# Patient Record
Sex: FEMALE | Race: WHITE | NOT HISPANIC OR LATINO
[De-identification: names, ages, dates, MRNs, and addresses within clinical notes are randomized per-mention and may not be internally consistent; named-entity substitution may affect disease eponyms.]

---

## 2018-02-26 ENCOUNTER — APPOINTMENT (OUTPATIENT)
Dept: ORTHOPEDIC SURGERY | Facility: CLINIC | Age: 28
End: 2018-02-26
Payer: COMMERCIAL

## 2018-02-26 VITALS
HEIGHT: 61 IN | BODY MASS INDEX: 21.14 KG/M2 | SYSTOLIC BLOOD PRESSURE: 99 MMHG | WEIGHT: 112 LBS | DIASTOLIC BLOOD PRESSURE: 59 MMHG | HEART RATE: 63 BPM

## 2018-02-26 DIAGNOSIS — S52.571A OTHER INTRAARTICULAR FRACTURE OF LOWER END OF RIGHT RADIUS, INITIAL ENCOUNTER FOR CLOSED FRACTURE: ICD-10-CM

## 2018-02-26 DIAGNOSIS — Z87.09 PERSONAL HISTORY OF OTHER DISEASES OF THE RESPIRATORY SYSTEM: ICD-10-CM

## 2018-02-26 PROBLEM — Z00.00 ENCOUNTER FOR PREVENTIVE HEALTH EXAMINATION: Status: ACTIVE | Noted: 2018-02-26

## 2018-02-26 PROCEDURE — 99204 OFFICE O/P NEW MOD 45 MIN: CPT

## 2018-02-26 PROCEDURE — 73110 X-RAY EXAM OF WRIST: CPT | Mod: 26,RT

## 2018-02-27 ENCOUNTER — APPOINTMENT (OUTPATIENT)
Dept: ORTHOPEDIC SURGERY | Facility: HOSPITAL | Age: 28
End: 2018-02-27

## 2018-02-27 ENCOUNTER — TRANSCRIPTION ENCOUNTER (OUTPATIENT)
Age: 28
End: 2018-02-27

## 2018-02-27 ENCOUNTER — OUTPATIENT (OUTPATIENT)
Dept: OUTPATIENT SERVICES | Facility: HOSPITAL | Age: 28
LOS: 1 days | End: 2018-02-27
Payer: COMMERCIAL

## 2018-02-27 VITALS
WEIGHT: 114.64 LBS | TEMPERATURE: 98 F | OXYGEN SATURATION: 97 % | DIASTOLIC BLOOD PRESSURE: 72 MMHG | SYSTOLIC BLOOD PRESSURE: 106 MMHG | RESPIRATION RATE: 14 BRPM | HEART RATE: 98 BPM | HEIGHT: 62 IN

## 2018-02-27 VITALS
SYSTOLIC BLOOD PRESSURE: 112 MMHG | OXYGEN SATURATION: 98 % | HEART RATE: 78 BPM | DIASTOLIC BLOOD PRESSURE: 74 MMHG | RESPIRATION RATE: 14 BRPM

## 2018-02-27 DIAGNOSIS — S52.90XA UNSPECIFIED FRACTURE OF UNSPECIFIED FOREARM, INITIAL ENCOUNTER FOR CLOSED FRACTURE: ICD-10-CM

## 2018-02-27 DIAGNOSIS — Z90.89 ACQUIRED ABSENCE OF OTHER ORGANS: Chronic | ICD-10-CM

## 2018-02-27 DIAGNOSIS — S52.571A OTHER INTRAARTICULAR FRACTURE OF LOWER END OF RIGHT RADIUS, INITIAL ENCOUNTER FOR CLOSED FRACTURE: ICD-10-CM

## 2018-02-27 DIAGNOSIS — J32.9 CHRONIC SINUSITIS, UNSPECIFIED: Chronic | ICD-10-CM

## 2018-02-27 LAB
HCG UR QL: NEGATIVE — SIGNIFICANT CHANGE UP
HCT VFR BLD CALC: 40.8 % — SIGNIFICANT CHANGE UP (ref 34.5–45)
HGB BLD-MCNC: 13.6 G/DL — SIGNIFICANT CHANGE UP (ref 11.5–15.5)
MCHC RBC-ENTMCNC: 27.2 PG — SIGNIFICANT CHANGE UP (ref 27–34)
MCHC RBC-ENTMCNC: 33.3 GM/DL — SIGNIFICANT CHANGE UP (ref 32–36)
MCV RBC AUTO: 81.9 FL — SIGNIFICANT CHANGE UP (ref 80–100)
PLATELET # BLD AUTO: 174 K/UL — SIGNIFICANT CHANGE UP (ref 150–400)
RBC # BLD: 4.98 M/UL — SIGNIFICANT CHANGE UP (ref 3.8–5.2)
RBC # FLD: 11.8 % — SIGNIFICANT CHANGE UP (ref 10.3–14.5)
WBC # BLD: 6.5 K/UL — SIGNIFICANT CHANGE UP (ref 3.8–10.5)
WBC # FLD AUTO: 6.5 K/UL — SIGNIFICANT CHANGE UP (ref 3.8–10.5)

## 2018-02-27 PROCEDURE — 29125 APPL SHORT ARM SPLINT STATIC: CPT | Mod: 59,RT

## 2018-02-27 PROCEDURE — C1713: CPT

## 2018-02-27 PROCEDURE — 76000 FLUOROSCOPY <1 HR PHYS/QHP: CPT

## 2018-02-27 PROCEDURE — 81025 URINE PREGNANCY TEST: CPT

## 2018-02-27 PROCEDURE — 25609 OPTX DST RD XART FX/EP SEP3+: CPT | Mod: RT

## 2018-02-27 PROCEDURE — 76000 FLUOROSCOPY <1 HR PHYS/QHP: CPT | Mod: 26,59,RT

## 2018-02-27 PROCEDURE — C1889: CPT

## 2018-02-27 PROCEDURE — 85027 COMPLETE CBC AUTOMATED: CPT

## 2018-02-27 RX ORDER — ONDANSETRON 8 MG/1
4 TABLET, FILM COATED ORAL ONCE
Qty: 0 | Refills: 0 | Status: DISCONTINUED | OUTPATIENT
Start: 2018-02-27 | End: 2018-02-28

## 2018-02-27 RX ORDER — SODIUM CHLORIDE 9 MG/ML
1000 INJECTION, SOLUTION INTRAVENOUS
Qty: 0 | Refills: 0 | Status: DISCONTINUED | OUTPATIENT
Start: 2018-02-27 | End: 2018-02-28

## 2018-02-27 RX ORDER — OXYCODONE HYDROCHLORIDE 5 MG/1
1 TABLET ORAL
Qty: 30 | Refills: 0
Start: 2018-02-27

## 2018-02-27 RX ORDER — OXYCODONE AND ACETAMINOPHEN 5; 325 MG/1; MG/1
1 TABLET ORAL EVERY 4 HOURS
Qty: 0 | Refills: 0 | Status: DISCONTINUED | OUTPATIENT
Start: 2018-02-27 | End: 2018-02-28

## 2018-02-27 RX ORDER — HYDROMORPHONE HYDROCHLORIDE 2 MG/ML
0.5 INJECTION INTRAMUSCULAR; INTRAVENOUS; SUBCUTANEOUS
Qty: 0 | Refills: 0 | Status: DISCONTINUED | OUTPATIENT
Start: 2018-02-27 | End: 2018-02-28

## 2018-02-27 RX ORDER — OXYCODONE AND ACETAMINOPHEN 5; 325 MG/1; MG/1
2 TABLET ORAL EVERY 6 HOURS
Qty: 0 | Refills: 0 | Status: DISCONTINUED | OUTPATIENT
Start: 2018-02-27 | End: 2018-02-28

## 2018-02-27 RX ORDER — CEFAZOLIN SODIUM 1 G
2000 VIAL (EA) INJECTION ONCE
Qty: 0 | Refills: 0 | Status: DISCONTINUED | OUTPATIENT
Start: 2018-02-27 | End: 2018-02-27

## 2018-02-27 NOTE — ASU DISCHARGE PLAN (ADULT/PEDIATRIC). - MEDICATION SUMMARY - MEDICATIONS TO TAKE
I will START or STAY ON the medications listed below when I get home from the hospital:    acetaminophen-oxyCODONE 325 mg-5 mg oral tablet  -- 1 tab(s) by mouth every 6 hours, As Needed - for -for severe pain MDD:6 tabs  -- Caution federal law prohibits the transfer of this drug to any person other  than the person for whom it was prescribed.  May cause drowsiness.  Alcohol may intensify this effect.  Use care when operating dangerous machinery.  This prescription cannot be refilled.  This product contains acetaminophen.  Do not use  with any other product containing acetaminophen to prevent possible liver damage.  Using more of this medication than prescribed may cause serious breathing problems.    -- Indication: For Severe pain as needed    albuterol with CFC 90 mcg/inh inhalation aerosol (obsolete)  -- Indication: For Asthma

## 2018-02-27 NOTE — BRIEF OPERATIVE NOTE - PROCEDURE
<<-----Click on this checkbox to enter Procedure ORIF fracture of right radius  02/27/2018  distal radius  Active  [email or web address]

## 2018-02-27 NOTE — ASU DISCHARGE PLAN (ADULT/PEDIATRIC). - FOLLOWUP APPOINTMENT CLINIC/PHYSICIAN
Follow up Dr. Jarrett as directed  Follow up primary care MD following discharge from facility Follow up Dr. Jarrett on 3/7/2018  Follow up primary care MD following discharge from facility

## 2018-02-27 NOTE — ASU DISCHARGE PLAN (ADULT/PEDIATRIC). - NOTIFY
Swelling that continues/Numbness, tingling/Numbness, color, or temperature change to extremity/Fever greater than 101/GYN Fever>100.4/Bleeding that does not stop Bleeding that does not stop/Swelling that continues/Numbness, color, or temperature change to extremity/Numbness, tingling/Fever greater than 101/Pain not relieved by Medications

## 2018-02-27 NOTE — ANESTHESIA FOLLOW-UP NOTE - NSRECOMMENDFT_GEN_ALL_CORE
Extensive discussion about sedation vs general anesthesia.  Patient prefers sedation.  Regional nerve block working properly, patient informed they may be aware of parts of the procedure and that IV sedation may not be enough to prevent awareness.  Patient understands and prefers to proceed with IV sedation.

## 2018-02-27 NOTE — H&P PST ADULT - MUSCULOSKELETAL COMMENTS
Right radius fracture right arm on splint wrapped in ace bandage < +echuymosis on left elbow right arm on splint wrapped in ace bandage , fingers mobile, cap refill >3 seconds ,+ brachial  pulses +ecchymosis on left elbow right arm on splint wrapped in ace bandage , fingers mobile, cap refill <3 seconds ,+ brachial  pulses, ecchymosis noted on left fore arm anterior and posterior aspect of elbow s/p fall

## 2018-02-27 NOTE — H&P PST ADULT - HISTORY OF PRESENT ILLNESS
This is a 28 y/o female who sustained right radius fracture presents with complaint of right arm pain and swelling . patient states she fell while snow boarding at [place] on 2/27/18 and fractured her radius  Went to Er at [place] Xray revealed right distal radius fracture . scheduled for ORIF right distal radius fracture on 2/27/18 This is a 26 y/o female who sustained right radius fracture presents with complaint of right arm pain and swelling . patient states she fell while snow boarding at [place] on 2/27/18 and fractured her radius  Went to ER at [place] Xray revealed right distal radius fracture . scheduled for ORIF right distal radius fracture on 2/27/18

## 2018-02-27 NOTE — ASU DISCHARGE PLAN (ADULT/PEDIATRIC). - SPECIAL INSTRUCTIONS
Rest   ice  elevate  wiggle finger wrist hand   Pain medications as needed  non- weight bearing right upper extremity Rest   ice  elevate  wiggle finger wrist hand   Pain medications as needed  non- weight bearing right upper extremity  sling

## 2018-02-27 NOTE — BRIEF OPERATIVE NOTE - PRE-OP DX
Fracture  02/27/2018  right distal radius  Active  Tio Waterman  Fracture  02/27/2018  right distal radius  Active  Tio Waterman Fracture  02/27/2018  right distal radius  Active  Tio Waterman  Fracture  02/27/2018  right distal radius  Active  Tio Waterman  Fracture of wrist  02/27/2018    Active  Christian Mejia

## 2018-02-27 NOTE — H&P PST ADULT - PSH
S/P tonsillectomy and adenoidectomy  2002  Sinusitis with nasal polyps  s/p removal of nasal polyps 2003

## 2018-02-28 ENCOUNTER — CHART COPY (OUTPATIENT)
Age: 28
End: 2018-02-28

## 2018-02-28 DIAGNOSIS — Z96.7 PRESENCE OF OTHER BONE AND TENDON IMPLANTS: ICD-10-CM

## 2018-02-28 DIAGNOSIS — Z87.81 PRESENCE OF OTHER BONE AND TENDON IMPLANTS: ICD-10-CM

## 2018-03-02 RX ORDER — OXYCODONE 5 MG/1
5 TABLET ORAL
Qty: 30 | Refills: 0 | Status: ACTIVE | COMMUNITY
Start: 2018-03-02 | End: 1900-01-01

## 2018-03-07 ENCOUNTER — APPOINTMENT (OUTPATIENT)
Dept: ORTHOPEDIC SURGERY | Facility: CLINIC | Age: 28
End: 2018-03-07
Payer: COMMERCIAL

## 2018-03-07 PROCEDURE — A4590: CPT | Mod: 58,RT

## 2018-03-07 PROCEDURE — 73110 X-RAY EXAM OF WRIST: CPT | Mod: RT

## 2018-03-07 PROCEDURE — 99024 POSTOP FOLLOW-UP VISIT: CPT

## 2018-03-07 PROCEDURE — 29075 APPL CST ELBW FNGR SHORT ARM: CPT | Mod: 58,RT

## 2018-03-28 ENCOUNTER — APPOINTMENT (OUTPATIENT)
Dept: ORTHOPEDIC SURGERY | Facility: CLINIC | Age: 28
End: 2018-03-28
Payer: COMMERCIAL

## 2018-03-28 VITALS — HEIGHT: 61 IN | WEIGHT: 112 LBS | BODY MASS INDEX: 21.14 KG/M2

## 2018-03-28 PROCEDURE — 73110 X-RAY EXAM OF WRIST: CPT | Mod: RT

## 2018-03-28 PROCEDURE — 99024 POSTOP FOLLOW-UP VISIT: CPT

## 2018-04-18 ENCOUNTER — APPOINTMENT (OUTPATIENT)
Dept: ORTHOPEDIC SURGERY | Facility: CLINIC | Age: 28
End: 2018-04-18

## 2018-04-23 ENCOUNTER — TRANSCRIPTION ENCOUNTER (OUTPATIENT)
Age: 28
End: 2018-04-23

## 2018-04-26 PROBLEM — S52.571D OTHER CLOSED INTRA-ARTICULAR FRACTURE OF DISTAL END OF RIGHT RADIUS WITH ROUTINE HEALING, SUBSEQUENT ENCOUNTER: Status: ACTIVE | Noted: 2018-03-05

## 2018-04-27 ENCOUNTER — APPOINTMENT (OUTPATIENT)
Dept: ORTHOPEDIC SURGERY | Facility: CLINIC | Age: 28
End: 2018-04-27

## 2018-04-27 DIAGNOSIS — S52.571D OTHER INTRAARTICULAR FRACTURE OF LOWER END OF RIGHT RADIUS, SUBSEQUENT ENCOUNTER FOR CLOSED FRACTURE WITH ROUTINE HEALING: ICD-10-CM

## 2018-07-17 PROBLEM — J45.909 UNSPECIFIED ASTHMA, UNCOMPLICATED: Chronic | Status: ACTIVE | Noted: 2018-02-27

## 2020-05-07 PROBLEM — S52.90XA UNSPECIFIED FRACTURE OF UNSPECIFIED FOREARM, INITIAL ENCOUNTER FOR CLOSED FRACTURE: Chronic | Status: ACTIVE | Noted: 2018-02-27

## 2020-06-03 ENCOUNTER — INPATIENT (INPATIENT)
Facility: HOSPITAL | Age: 30
LOS: 0 days | Discharge: HOME | End: 2020-06-04
Attending: OBSTETRICS & GYNECOLOGY | Admitting: OBSTETRICS & GYNECOLOGY
Payer: COMMERCIAL

## 2020-06-03 VITALS — WEIGHT: 138.89 LBS | HEIGHT: 61 IN

## 2020-06-03 DIAGNOSIS — O26.899 OTHER SPECIFIED PREGNANCY RELATED CONDITIONS, UNSPECIFIED TRIMESTER: ICD-10-CM

## 2020-06-03 DIAGNOSIS — Z90.89 ACQUIRED ABSENCE OF OTHER ORGANS: Chronic | ICD-10-CM

## 2020-06-03 DIAGNOSIS — J32.9 CHRONIC SINUSITIS, UNSPECIFIED: Chronic | ICD-10-CM

## 2020-06-03 LAB
BASOPHILS # BLD AUTO: 0.03 K/UL — SIGNIFICANT CHANGE UP (ref 0–0.2)
BASOPHILS NFR BLD AUTO: 0.2 % — SIGNIFICANT CHANGE UP (ref 0–2)
EOSINOPHIL # BLD AUTO: 0.12 K/UL — SIGNIFICANT CHANGE UP (ref 0–0.5)
EOSINOPHIL NFR BLD AUTO: 0.9 % — SIGNIFICANT CHANGE UP (ref 0–6)
HCT VFR BLD CALC: 33.3 % — LOW (ref 34.5–45)
HGB BLD-MCNC: 11.2 G/DL — LOW (ref 11.5–15.5)
IMM GRANULOCYTES NFR BLD AUTO: 0.5 % — SIGNIFICANT CHANGE UP (ref 0–1.5)
LYMPHOCYTES # BLD AUTO: 1.73 K/UL — SIGNIFICANT CHANGE UP (ref 1–3.3)
LYMPHOCYTES # BLD AUTO: 13.2 % — SIGNIFICANT CHANGE UP (ref 13–44)
MCHC RBC-ENTMCNC: 29.9 PG — SIGNIFICANT CHANGE UP (ref 27–34)
MCHC RBC-ENTMCNC: 33.6 GM/DL — SIGNIFICANT CHANGE UP (ref 32–36)
MCV RBC AUTO: 89 FL — SIGNIFICANT CHANGE UP (ref 80–100)
MONOCYTES # BLD AUTO: 0.92 K/UL — HIGH (ref 0–0.9)
MONOCYTES NFR BLD AUTO: 7 % — SIGNIFICANT CHANGE UP (ref 2–14)
NEUTROPHILS # BLD AUTO: 10.24 K/UL — HIGH (ref 1.8–7.4)
NEUTROPHILS NFR BLD AUTO: 78.2 % — HIGH (ref 43–77)
PLATELET # BLD AUTO: 109 K/UL — LOW (ref 150–400)
RBC # BLD: 3.74 M/UL — LOW (ref 3.8–5.2)
RBC # FLD: 13.7 % — SIGNIFICANT CHANGE UP (ref 10.3–14.5)
SARS-COV-2 RNA SPEC QL NAA+PROBE: SIGNIFICANT CHANGE UP
T PALLIDUM AB TITR SER: NEGATIVE — SIGNIFICANT CHANGE UP
WBC # BLD: 13.1 K/UL — HIGH (ref 3.8–10.5)
WBC # FLD AUTO: 13.1 K/UL — HIGH (ref 3.8–10.5)

## 2020-06-03 PROCEDURE — C1889: CPT

## 2020-06-03 PROCEDURE — 86900 BLOOD TYPING SEROLOGIC ABO: CPT

## 2020-06-03 PROCEDURE — 59050 FETAL MONITOR W/REPORT: CPT

## 2020-06-03 PROCEDURE — G0463: CPT

## 2020-06-03 PROCEDURE — 85018 HEMOGLOBIN: CPT

## 2020-06-03 PROCEDURE — 86780 TREPONEMA PALLIDUM: CPT

## 2020-06-03 PROCEDURE — 86850 RBC ANTIBODY SCREEN: CPT

## 2020-06-03 PROCEDURE — 87086 URINE CULTURE/COLONY COUNT: CPT

## 2020-06-03 PROCEDURE — 85014 HEMATOCRIT: CPT

## 2020-06-03 PROCEDURE — 85025 COMPLETE CBC W/AUTO DIFF WBC: CPT

## 2020-06-03 PROCEDURE — 86901 BLOOD TYPING SEROLOGIC RH(D): CPT

## 2020-06-03 PROCEDURE — 36415 COLL VENOUS BLD VENIPUNCTURE: CPT

## 2020-06-03 PROCEDURE — 94760 N-INVAS EAR/PLS OXIMETRY 1: CPT

## 2020-06-03 PROCEDURE — U0003: CPT

## 2020-06-03 RX ORDER — SODIUM CHLORIDE 9 MG/ML
3 INJECTION INTRAMUSCULAR; INTRAVENOUS; SUBCUTANEOUS EVERY 8 HOURS
Refills: 0 | Status: DISCONTINUED | OUTPATIENT
Start: 2020-06-03 | End: 2020-06-04

## 2020-06-03 RX ORDER — LANOLIN
1 OINTMENT (GRAM) TOPICAL EVERY 6 HOURS
Refills: 0 | Status: DISCONTINUED | OUTPATIENT
Start: 2020-06-03 | End: 2020-06-04

## 2020-06-03 RX ORDER — ACETAMINOPHEN 500 MG
975 TABLET ORAL
Refills: 0 | Status: DISCONTINUED | OUTPATIENT
Start: 2020-06-03 | End: 2020-06-04

## 2020-06-03 RX ORDER — OXYTOCIN 10 UNIT/ML
333.33 VIAL (ML) INJECTION
Qty: 20 | Refills: 0 | Status: DISCONTINUED | OUTPATIENT
Start: 2020-06-03 | End: 2020-06-04

## 2020-06-03 RX ORDER — DIPHENHYDRAMINE HCL 50 MG
25 CAPSULE ORAL EVERY 6 HOURS
Refills: 0 | Status: DISCONTINUED | OUTPATIENT
Start: 2020-06-03 | End: 2020-06-04

## 2020-06-03 RX ORDER — IBUPROFEN 200 MG
600 TABLET ORAL EVERY 6 HOURS
Refills: 0 | Status: COMPLETED | OUTPATIENT
Start: 2020-06-03 | End: 2021-05-02

## 2020-06-03 RX ORDER — PRAMOXINE HYDROCHLORIDE 150 MG/15G
1 AEROSOL, FOAM RECTAL EVERY 4 HOURS
Refills: 0 | Status: DISCONTINUED | OUTPATIENT
Start: 2020-06-03 | End: 2020-06-04

## 2020-06-03 RX ORDER — BENZOCAINE 10 %
1 GEL (GRAM) MUCOUS MEMBRANE EVERY 6 HOURS
Refills: 0 | Status: DISCONTINUED | OUTPATIENT
Start: 2020-06-03 | End: 2020-06-04

## 2020-06-03 RX ORDER — KETOROLAC TROMETHAMINE 30 MG/ML
30 SYRINGE (ML) INJECTION ONCE
Refills: 0 | Status: DISCONTINUED | OUTPATIENT
Start: 2020-06-03 | End: 2020-06-03

## 2020-06-03 RX ORDER — TETANUS TOXOID, REDUCED DIPHTHERIA TOXOID AND ACELLULAR PERTUSSIS VACCINE, ADSORBED 5; 2.5; 8; 8; 2.5 [IU]/.5ML; [IU]/.5ML; UG/.5ML; UG/.5ML; UG/.5ML
0.5 SUSPENSION INTRAMUSCULAR ONCE
Refills: 0 | Status: DISCONTINUED | OUTPATIENT
Start: 2020-06-03 | End: 2020-06-04

## 2020-06-03 RX ORDER — HYDROCORTISONE 1 %
1 OINTMENT (GRAM) TOPICAL EVERY 6 HOURS
Refills: 0 | Status: DISCONTINUED | OUTPATIENT
Start: 2020-06-03 | End: 2020-06-04

## 2020-06-03 RX ORDER — AER TRAVELER 0.5 G/1
1 SOLUTION RECTAL; TOPICAL EVERY 4 HOURS
Refills: 0 | Status: DISCONTINUED | OUTPATIENT
Start: 2020-06-03 | End: 2020-06-04

## 2020-06-03 RX ORDER — MAGNESIUM HYDROXIDE 400 MG/1
30 TABLET, CHEWABLE ORAL
Refills: 0 | Status: DISCONTINUED | OUTPATIENT
Start: 2020-06-03 | End: 2020-06-04

## 2020-06-03 RX ORDER — DIBUCAINE 1 %
1 OINTMENT (GRAM) RECTAL EVERY 6 HOURS
Refills: 0 | Status: DISCONTINUED | OUTPATIENT
Start: 2020-06-03 | End: 2020-06-04

## 2020-06-03 RX ORDER — SIMETHICONE 80 MG/1
80 TABLET, CHEWABLE ORAL EVERY 4 HOURS
Refills: 0 | Status: DISCONTINUED | OUTPATIENT
Start: 2020-06-03 | End: 2020-06-04

## 2020-06-03 RX ORDER — IBUPROFEN 200 MG
600 TABLET ORAL EVERY 6 HOURS
Refills: 0 | Status: DISCONTINUED | OUTPATIENT
Start: 2020-06-03 | End: 2020-06-04

## 2020-06-03 RX ORDER — CITRIC ACID/SODIUM CITRATE 300-500 MG
30 SOLUTION, ORAL ORAL ONCE
Refills: 0 | Status: DISCONTINUED | OUTPATIENT
Start: 2020-06-03 | End: 2020-06-03

## 2020-06-03 RX ORDER — SODIUM CHLORIDE 9 MG/ML
1000 INJECTION, SOLUTION INTRAVENOUS
Refills: 0 | Status: DISCONTINUED | OUTPATIENT
Start: 2020-06-03 | End: 2020-06-03

## 2020-06-03 RX ADMIN — Medication 30 MILLIGRAM(S): at 08:15

## 2020-06-03 RX ADMIN — Medication 975 MILLIGRAM(S): at 15:55

## 2020-06-03 RX ADMIN — Medication 1000 MILLIUNIT(S)/MIN: at 07:20

## 2020-06-03 RX ADMIN — Medication 600 MILLIGRAM(S): at 14:25

## 2020-06-03 RX ADMIN — Medication 975 MILLIGRAM(S): at 21:11

## 2020-06-03 RX ADMIN — Medication 975 MILLIGRAM(S): at 21:10

## 2020-06-04 ENCOUNTER — TRANSCRIPTION ENCOUNTER (OUTPATIENT)
Age: 30
End: 2020-06-04

## 2020-06-04 VITALS
RESPIRATION RATE: 17 BRPM | SYSTOLIC BLOOD PRESSURE: 90 MMHG | HEART RATE: 91 BPM | DIASTOLIC BLOOD PRESSURE: 56 MMHG | TEMPERATURE: 98 F | OXYGEN SATURATION: 100 %

## 2020-06-04 LAB
CULTURE RESULTS: SIGNIFICANT CHANGE UP
HCT VFR BLD CALC: 32.8 % — LOW (ref 34.5–45)
HGB BLD-MCNC: 11.2 G/DL — LOW (ref 11.5–15.5)
SPECIMEN SOURCE: SIGNIFICANT CHANGE UP

## 2020-06-04 RX ORDER — ACETAMINOPHEN 500 MG
3 TABLET ORAL
Qty: 0 | Refills: 0 | DISCHARGE
Start: 2020-06-04

## 2020-06-04 RX ORDER — ALBUTEROL 90 UG/1
0 AEROSOL, METERED ORAL
Qty: 0 | Refills: 0 | DISCHARGE

## 2020-06-04 RX ORDER — IBUPROFEN 200 MG
1 TABLET ORAL
Qty: 0 | Refills: 0 | DISCHARGE
Start: 2020-06-04

## 2020-06-04 RX ADMIN — Medication 975 MILLIGRAM(S): at 09:40

## 2020-06-04 RX ADMIN — Medication 600 MILLIGRAM(S): at 06:39

## 2020-06-04 RX ADMIN — Medication 600 MILLIGRAM(S): at 00:03

## 2020-06-04 RX ADMIN — Medication 975 MILLIGRAM(S): at 03:10

## 2020-06-04 NOTE — DISCHARGE NOTE OB - CARE PLAN
Principal Discharge DX:	Vaginal delivery  Goal:	full recovery  Assessment and plan of treatment:	regular activity, regular diet, follow up 6 weeks

## 2020-06-04 NOTE — DISCHARGE NOTE OB - CARE PROVIDER_API CALL
Maribel Londono  OBSTETRICS AND GYNECOLOGY  [address] [place] [address]  Phone: [phone]  Fax: [phone]  Follow Up Time:

## 2020-06-04 NOTE — DISCHARGE NOTE OB - MEDICATION SUMMARY - MEDICATIONS TO TAKE
I will START or STAY ON the medications listed below when I get home from the hospital:    ibuprofen 600 mg oral tablet  -- 1 tab(s) by mouth every 6 hours  -- Indication: For vaginal delivery    acetaminophen 325 mg oral tablet  -- 3 tab(s) by mouth   -- Indication: For vaginal delivery

## 2020-06-04 NOTE — DISCHARGE NOTE OB - HOSPITAL COURSE
Patient presented with premature rupture of membranes at 38.5 weeks gestational age. Had uncomplicated vaginal delivery viable female . Uncomplicated postpartum course.

## 2020-06-04 NOTE — DISCHARGE NOTE OB - MEDICATION SUMMARY - MEDICATIONS TO STOP TAKING
I will STOP taking the medications listed below when I get home from the hospital:    acetaminophen-oxyCODONE 325 mg-5 mg oral tablet  -- 1 tab(s) by mouth every 6 hours, As Needed - for -for severe pain MDD:6 tabs  -- Caution federal law prohibits the transfer of this drug to any person other  than the person for whom it was prescribed.  May cause drowsiness.  Alcohol may intensify this effect.  Use care when operating dangerous machinery.  This prescription cannot be refilled.  This product contains acetaminophen.  Do not use  with any other product containing acetaminophen to prevent possible liver damage.  Using more of this medication than prescribed may cause serious breathing problems.

## 2020-06-04 NOTE — PROGRESS NOTE ADULT - SUBJECTIVE AND OBJECTIVE BOX
S: Patient doing well. Minimal lochia. No complaints. requesting d/c home today    O: Vital Signs Last 24 Hrs  T(C): 36.7 (2020 08:40), Max: 36.9 (2020 11:07)  T(F): 98 (2020 08:40), Max: 98.5 (2020 11:07)  HR: 91 (2020 08:40) (58 - 91)  BP: 90/56 (2020 08:40) (87/48 - 106/60)  BP(mean): --  RR: 17 (2020 08:40) (17 - 18)  SpO2: 100% (2020 08:40) (97% - 100%)    Gen: NAD  breasts   Abd: soft, NT, ND, fundus firm below umbilicus  Ext: no tenderness    Labs:                        11.2   x     )-----------( x        ( 2020 08:01 )             32.8        Rubella status:    A: 30y PPD# 2 s/p      Doing well    Plan:  ppd 1 stable  doing well  breast feeding  labial tear care d/w pt voiding well no pain  discharge instructions d/w pt and   advance care

## 2020-06-04 NOTE — DISCHARGE NOTE OB - PATIENT PORTAL LINK FT
You can access the FollowMyHealth Patient Portal offered by [facility] by registering at the following website: [email or web address]. By joining [employer]’s FollowMyHealth portal, you will also be able to view your health information using other applications (apps) compatible with our system.

## 2020-06-06 DIAGNOSIS — Z3A.38 38 WEEKS GESTATION OF PREGNANCY: ICD-10-CM

## 2022-01-14 ENCOUNTER — TRANSCRIPTION ENCOUNTER (OUTPATIENT)
Age: 32
End: 2022-01-14

## 2022-04-01 NOTE — PATIENT PROFILE OB - ABILITY TO HEAR (WITH HEARING AID OR HEARING APPLIANCE IF NORMALLY USED):
I have made a couple calls to patient and also called to [employer] this afternoon ~ in regards to a fax we received from [employer] this afternoon.    The Invokana that the patient has been taking is not going through this refill and they are asking that we pick an alternative medication.     This is because of the new year's  formulary change.    Farxiga sent, to replace the Invokana. Patient agrees.   Because of the higher initial  co pay, patient will need to call [employer] tomorrow and authorize~ before they will ship out. Her cost will probably go down after her deductible is met.     Patient stated that she would call them tomorrow and authorize the shipment.  
Adequate: hears normal conversation without difficulty

## 2022-07-19 ENCOUNTER — TRANSCRIPTION ENCOUNTER (OUTPATIENT)
Age: 32
End: 2022-07-19

## 2022-07-19 ENCOUNTER — RESULT REVIEW (OUTPATIENT)
Age: 32
End: 2022-07-19

## 2022-07-19 ENCOUNTER — INPATIENT (INPATIENT)
Facility: HOSPITAL | Age: 32
LOS: 1 days | Discharge: HOME | End: 2022-07-21
Attending: OBSTETRICS & GYNECOLOGY | Admitting: OBSTETRICS & GYNECOLOGY
Payer: COMMERCIAL

## 2022-07-19 VITALS — HEIGHT: 61 IN | WEIGHT: 151.9 LBS

## 2022-07-19 DIAGNOSIS — Z90.89 ACQUIRED ABSENCE OF OTHER ORGANS: Chronic | ICD-10-CM

## 2022-07-19 DIAGNOSIS — Z3A.00 WEEKS OF GESTATION OF PREGNANCY NOT SPECIFIED: ICD-10-CM

## 2022-07-19 DIAGNOSIS — O26.899 OTHER SPECIFIED PREGNANCY RELATED CONDITIONS, UNSPECIFIED TRIMESTER: ICD-10-CM

## 2022-07-19 DIAGNOSIS — J32.9 CHRONIC SINUSITIS, UNSPECIFIED: Chronic | ICD-10-CM

## 2022-07-19 LAB
BASOPHILS # BLD AUTO: 0.02 K/UL — SIGNIFICANT CHANGE UP (ref 0–0.2)
BASOPHILS NFR BLD AUTO: 0.2 % — SIGNIFICANT CHANGE UP (ref 0–2)
EOSINOPHIL # BLD AUTO: 0.19 K/UL — SIGNIFICANT CHANGE UP (ref 0–0.5)
EOSINOPHIL NFR BLD AUTO: 1.9 % — SIGNIFICANT CHANGE UP (ref 0–6)
HCT VFR BLD CALC: 38 % — SIGNIFICANT CHANGE UP (ref 34.5–45)
HGB BLD-MCNC: 12.8 G/DL — SIGNIFICANT CHANGE UP (ref 11.5–15.5)
IMM GRANULOCYTES NFR BLD AUTO: 0.6 % — SIGNIFICANT CHANGE UP (ref 0–1.5)
LYMPHOCYTES # BLD AUTO: 1.93 K/UL — SIGNIFICANT CHANGE UP (ref 1–3.3)
LYMPHOCYTES # BLD AUTO: 19.8 % — SIGNIFICANT CHANGE UP (ref 13–44)
MCHC RBC-ENTMCNC: 29.7 PG — SIGNIFICANT CHANGE UP (ref 27–34)
MCHC RBC-ENTMCNC: 33.7 GM/DL — SIGNIFICANT CHANGE UP (ref 32–36)
MCV RBC AUTO: 88.2 FL — SIGNIFICANT CHANGE UP (ref 80–100)
MONOCYTES # BLD AUTO: 0.82 K/UL — SIGNIFICANT CHANGE UP (ref 0–0.9)
MONOCYTES NFR BLD AUTO: 8.4 % — SIGNIFICANT CHANGE UP (ref 2–14)
NEUTROPHILS # BLD AUTO: 6.75 K/UL — SIGNIFICANT CHANGE UP (ref 1.8–7.4)
NEUTROPHILS NFR BLD AUTO: 69.1 % — SIGNIFICANT CHANGE UP (ref 43–77)
PLATELET # BLD AUTO: 148 K/UL — LOW (ref 150–400)
RBC # BLD: 4.31 M/UL — SIGNIFICANT CHANGE UP (ref 3.8–5.2)
RBC # FLD: 14.1 % — SIGNIFICANT CHANGE UP (ref 10.3–14.5)
WBC # BLD: 9.77 K/UL — SIGNIFICANT CHANGE UP (ref 3.8–10.5)
WBC # FLD AUTO: 9.77 K/UL — SIGNIFICANT CHANGE UP (ref 3.8–10.5)

## 2022-07-19 PROCEDURE — 86901 BLOOD TYPING SEROLOGIC RH(D): CPT

## 2022-07-19 PROCEDURE — 88307 TISSUE EXAM BY PATHOLOGIST: CPT

## 2022-07-19 PROCEDURE — 86900 BLOOD TYPING SEROLOGIC ABO: CPT

## 2022-07-19 PROCEDURE — 86769 SARS-COV-2 COVID-19 ANTIBODY: CPT

## 2022-07-19 PROCEDURE — 88307 TISSUE EXAM BY PATHOLOGIST: CPT | Mod: 26

## 2022-07-19 PROCEDURE — 59050 FETAL MONITOR W/REPORT: CPT

## 2022-07-19 PROCEDURE — 86850 RBC ANTIBODY SCREEN: CPT

## 2022-07-19 PROCEDURE — C1889: CPT

## 2022-07-19 PROCEDURE — 86780 TREPONEMA PALLIDUM: CPT

## 2022-07-19 PROCEDURE — 99214 OFFICE O/P EST MOD 30 MIN: CPT

## 2022-07-19 PROCEDURE — 85014 HEMATOCRIT: CPT

## 2022-07-19 PROCEDURE — U0003: CPT

## 2022-07-19 PROCEDURE — 94760 N-INVAS EAR/PLS OXIMETRY 1: CPT

## 2022-07-19 PROCEDURE — 85025 COMPLETE CBC W/AUTO DIFF WBC: CPT

## 2022-07-19 PROCEDURE — 85018 HEMOGLOBIN: CPT

## 2022-07-19 PROCEDURE — 36415 COLL VENOUS BLD VENIPUNCTURE: CPT

## 2022-07-19 PROCEDURE — U0005: CPT

## 2022-07-19 RX ORDER — OXYTOCIN 10 UNIT/ML
333.33 VIAL (ML) INJECTION
Qty: 20 | Refills: 0 | Status: DISCONTINUED | OUTPATIENT
Start: 2022-07-19 | End: 2022-07-21

## 2022-07-19 RX ORDER — SODIUM CHLORIDE 9 MG/ML
1000 INJECTION, SOLUTION INTRAVENOUS
Refills: 0 | Status: DISCONTINUED | OUTPATIENT
Start: 2022-07-19 | End: 2022-07-20

## 2022-07-19 RX ORDER — CITRIC ACID/SODIUM CITRATE 300-500 MG
30 SOLUTION, ORAL ORAL ONCE
Refills: 0 | Status: DISCONTINUED | OUTPATIENT
Start: 2022-07-19 | End: 2022-07-20

## 2022-07-19 RX ORDER — CHLORHEXIDINE GLUCONATE 213 G/1000ML
1 SOLUTION TOPICAL ONCE
Refills: 0 | Status: DISCONTINUED | OUTPATIENT
Start: 2022-07-19 | End: 2022-07-20

## 2022-07-19 RX ORDER — CEFAZOLIN SODIUM 1 G
2000 VIAL (EA) INJECTION ONCE
Refills: 0 | Status: COMPLETED | OUTPATIENT
Start: 2022-07-19 | End: 2022-07-19

## 2022-07-19 RX ADMIN — SODIUM CHLORIDE 125 MILLILITER(S): 9 INJECTION, SOLUTION INTRAVENOUS at 21:20

## 2022-07-19 NOTE — PATIENT PROFILE OB - FALL HARM RISK - UNIVERSAL INTERVENTIONS
Bed in lowest position, wheels locked, appropriate side rails in place/Call bell, personal items and telephone in reach/Instruct patient to call for assistance before getting out of bed or chair/Non-slip footwear when patient is out of bed/Kite to call system/Physically safe environment - no spills, clutter or unnecessary equipment/Purposeful Proactive Rounding/Room/bathroom lighting operational, light cord in reach

## 2022-07-20 LAB
COVID-19 SPIKE DOMAIN AB INTERP: POSITIVE
COVID-19 SPIKE DOMAIN ANTIBODY RESULT: >250 U/ML — HIGH
HCT VFR BLD CALC: 31 % — LOW (ref 34.5–45)
HGB BLD-MCNC: 10.7 G/DL — LOW (ref 11.5–15.5)
SARS-COV-2 IGG+IGM SERPL QL IA: >250 U/ML — HIGH
SARS-COV-2 IGG+IGM SERPL QL IA: POSITIVE
SARS-COV-2 RNA SPEC QL NAA+PROBE: SIGNIFICANT CHANGE UP
T PALLIDUM AB TITR SER: NEGATIVE — SIGNIFICANT CHANGE UP

## 2022-07-20 RX ORDER — OXYCODONE HYDROCHLORIDE 5 MG/1
5 TABLET ORAL ONCE
Refills: 0 | Status: DISCONTINUED | OUTPATIENT
Start: 2022-07-19 | End: 2022-07-21

## 2022-07-20 RX ORDER — PRAMOXINE HYDROCHLORIDE 150 MG/15G
1 AEROSOL, FOAM RECTAL EVERY 4 HOURS
Refills: 0 | Status: DISCONTINUED | OUTPATIENT
Start: 2022-07-19 | End: 2022-07-21

## 2022-07-20 RX ORDER — ACETAMINOPHEN 500 MG
975 TABLET ORAL
Refills: 0 | Status: DISCONTINUED | OUTPATIENT
Start: 2022-07-19 | End: 2022-07-21

## 2022-07-20 RX ORDER — TETANUS TOXOID, REDUCED DIPHTHERIA TOXOID AND ACELLULAR PERTUSSIS VACCINE, ADSORBED 5; 2.5; 8; 8; 2.5 [IU]/.5ML; [IU]/.5ML; UG/.5ML; UG/.5ML; UG/.5ML
0.5 SUSPENSION INTRAMUSCULAR ONCE
Refills: 0 | Status: DISCONTINUED | OUTPATIENT
Start: 2022-07-19 | End: 2022-07-21

## 2022-07-20 RX ORDER — MAGNESIUM HYDROXIDE 400 MG/1
30 TABLET, CHEWABLE ORAL
Refills: 0 | Status: DISCONTINUED | OUTPATIENT
Start: 2022-07-19 | End: 2022-07-21

## 2022-07-20 RX ORDER — SODIUM CHLORIDE 9 MG/ML
3 INJECTION INTRAMUSCULAR; INTRAVENOUS; SUBCUTANEOUS EVERY 8 HOURS
Refills: 0 | Status: DISCONTINUED | OUTPATIENT
Start: 2022-07-19 | End: 2022-07-20

## 2022-07-20 RX ORDER — SIMETHICONE 80 MG/1
80 TABLET, CHEWABLE ORAL EVERY 4 HOURS
Refills: 0 | Status: DISCONTINUED | OUTPATIENT
Start: 2022-07-19 | End: 2022-07-21

## 2022-07-20 RX ORDER — LANOLIN
1 OINTMENT (GRAM) TOPICAL EVERY 6 HOURS
Refills: 0 | Status: DISCONTINUED | OUTPATIENT
Start: 2022-07-19 | End: 2022-07-21

## 2022-07-20 RX ORDER — IBUPROFEN 200 MG
600 TABLET ORAL EVERY 6 HOURS
Refills: 0 | Status: COMPLETED | OUTPATIENT
Start: 2022-07-19 | End: 2023-06-17

## 2022-07-20 RX ORDER — OXYCODONE HYDROCHLORIDE 5 MG/1
5 TABLET ORAL
Refills: 0 | Status: DISCONTINUED | OUTPATIENT
Start: 2022-07-19 | End: 2022-07-21

## 2022-07-20 RX ORDER — AER TRAVELER 0.5 G/1
1 SOLUTION RECTAL; TOPICAL EVERY 4 HOURS
Refills: 0 | Status: DISCONTINUED | OUTPATIENT
Start: 2022-07-19 | End: 2022-07-21

## 2022-07-20 RX ORDER — BENZOCAINE 10 %
1 GEL (GRAM) MUCOUS MEMBRANE EVERY 6 HOURS
Refills: 0 | Status: DISCONTINUED | OUTPATIENT
Start: 2022-07-19 | End: 2022-07-21

## 2022-07-20 RX ORDER — DIBUCAINE 1 %
1 OINTMENT (GRAM) RECTAL EVERY 6 HOURS
Refills: 0 | Status: DISCONTINUED | OUTPATIENT
Start: 2022-07-19 | End: 2022-07-21

## 2022-07-20 RX ORDER — DIPHENHYDRAMINE HCL 50 MG
25 CAPSULE ORAL EVERY 6 HOURS
Refills: 0 | Status: DISCONTINUED | OUTPATIENT
Start: 2022-07-19 | End: 2022-07-21

## 2022-07-20 RX ORDER — IBUPROFEN 200 MG
600 TABLET ORAL EVERY 6 HOURS
Refills: 0 | Status: DISCONTINUED | OUTPATIENT
Start: 2022-07-20 | End: 2022-07-21

## 2022-07-20 RX ORDER — HYDROCORTISONE 1 %
1 OINTMENT (GRAM) TOPICAL EVERY 6 HOURS
Refills: 0 | Status: DISCONTINUED | OUTPATIENT
Start: 2022-07-19 | End: 2022-07-21

## 2022-07-20 RX ORDER — KETOROLAC TROMETHAMINE 30 MG/ML
30 SYRINGE (ML) INJECTION ONCE
Refills: 0 | Status: DISCONTINUED | OUTPATIENT
Start: 2022-07-19 | End: 2022-07-20

## 2022-07-20 RX ORDER — OXYTOCIN 10 UNIT/ML
333.33 VIAL (ML) INJECTION
Qty: 20 | Refills: 0 | Status: COMPLETED | OUTPATIENT
Start: 2022-07-19 | End: 2022-07-19

## 2022-07-20 RX ADMIN — SODIUM CHLORIDE 3 MILLILITER(S): 9 INJECTION INTRAMUSCULAR; INTRAVENOUS; SUBCUTANEOUS at 06:54

## 2022-07-20 RX ADMIN — Medication 600 MILLIGRAM(S): at 07:35

## 2022-07-20 RX ADMIN — Medication 30 MILLIGRAM(S): at 00:15

## 2022-07-20 RX ADMIN — Medication 1 TABLET(S): at 10:44

## 2022-07-20 RX ADMIN — Medication 600 MILLIGRAM(S): at 17:58

## 2022-07-20 RX ADMIN — Medication 975 MILLIGRAM(S): at 15:00

## 2022-07-20 RX ADMIN — Medication 600 MILLIGRAM(S): at 12:00

## 2022-07-20 RX ADMIN — Medication 1000 MILLIUNIT(S)/MIN: at 00:58

## 2022-07-20 RX ADMIN — Medication 975 MILLIGRAM(S): at 03:45

## 2022-07-20 RX ADMIN — Medication 975 MILLIGRAM(S): at 15:05

## 2022-07-20 RX ADMIN — Medication 100 MILLIGRAM(S): at 00:00

## 2022-07-20 RX ADMIN — Medication 975 MILLIGRAM(S): at 21:25

## 2022-07-20 RX ADMIN — Medication 600 MILLIGRAM(S): at 17:59

## 2022-07-20 RX ADMIN — Medication 975 MILLIGRAM(S): at 09:03

## 2022-07-20 RX ADMIN — Medication 975 MILLIGRAM(S): at 02:51

## 2022-07-20 RX ADMIN — Medication 600 MILLIGRAM(S): at 23:52

## 2022-07-20 RX ADMIN — Medication 975 MILLIGRAM(S): at 22:23

## 2022-07-20 RX ADMIN — Medication 975 MILLIGRAM(S): at 09:00

## 2022-07-20 RX ADMIN — Medication 600 MILLIGRAM(S): at 12:04

## 2022-07-20 RX ADMIN — Medication 600 MILLIGRAM(S): at 06:38

## 2022-07-21 ENCOUNTER — TRANSCRIPTION ENCOUNTER (OUTPATIENT)
Age: 32
End: 2022-07-21

## 2022-07-21 VITALS
DIASTOLIC BLOOD PRESSURE: 50 MMHG | TEMPERATURE: 98 F | OXYGEN SATURATION: 99 % | RESPIRATION RATE: 16 BRPM | SYSTOLIC BLOOD PRESSURE: 104 MMHG | HEART RATE: 59 BPM

## 2022-07-21 RX ORDER — ACETAMINOPHEN 500 MG
3 TABLET ORAL
Qty: 0 | Refills: 0 | DISCHARGE
Start: 2022-07-21

## 2022-07-21 RX ORDER — IBUPROFEN 200 MG
1 TABLET ORAL
Qty: 0 | Refills: 0 | DISCHARGE
Start: 2022-07-21

## 2022-07-21 RX ADMIN — Medication 975 MILLIGRAM(S): at 10:14

## 2022-07-21 RX ADMIN — Medication 600 MILLIGRAM(S): at 00:48

## 2022-07-21 RX ADMIN — Medication 600 MILLIGRAM(S): at 07:21

## 2022-07-21 RX ADMIN — Medication 600 MILLIGRAM(S): at 06:25

## 2022-07-21 RX ADMIN — Medication 975 MILLIGRAM(S): at 09:03

## 2022-07-21 RX ADMIN — Medication 975 MILLIGRAM(S): at 04:30

## 2022-07-21 RX ADMIN — Medication 975 MILLIGRAM(S): at 03:35

## 2022-07-21 NOTE — DISCHARGE NOTE OB - PATIENT PORTAL LINK FT
You can access the FollowMyHealth Patient Portal offered by [facility] by registering at the following website: [email or web address]. By joining [employer]’s FollowMyHealth portal, you will also be able to view your health information using other applications (apps) compatible with our system.

## 2022-07-21 NOTE — DISCHARGE NOTE OB - CARE PLAN
Principal Discharge DX:	Vaginal delivery  Assessment and plan of treatment:	regular diet, regular activity, follow up 6 weeks   1

## 2022-07-21 NOTE — PROGRESS NOTE ADULT - SUBJECTIVE AND OBJECTIVE BOX
S: Patient doing well. Minimal lochia. Pain controlled.    O: Vital Signs Last 24 Hrs  T(C): 36.8 (2022 07:34), Max: 36.8 (2022 07:34)  T(F): 98.3 (2022 07:34), Max: 98.3 (2022 07:34)  HR: 59 (2022 07:34) (59 - 63)  BP: 104/50 (2022 07:34) (95/58 - 104/50)  BP(mean): --  RR: 16 (2022 07:34) (16 - 16)  SpO2: 99% (2022 07:34) (97% - 99%)    Parameters below as of 2022 07:34  Patient On (Oxygen Delivery Method): room air        Gen: NAD  Abd: soft, NT, ND, fundus firm below umbilicus  Lochia: moderate  Ext: no tenderness    Labs:                        10.7   x     )-----------( x        ( 2022 07:57 )             31.0       A: 32y PPD#1 s/p  doing well.    Plan: Stable for discharge home. 
S: Patient doing well. Minimal lochia. No complaints. Breastfeeding    O: Vital Signs Last 24 Hrs  T(C): 36.8 (2022 05:23), Max: 36.8 (2022 05:23)  T(F): 98.2 (2022 05:23), Max: 98.2 (2022 05:23)  HR: 67 (2022 05:23) (56 - 79)  BP: 98/54 (2022 05:23) (96/44 - 113/66)  BP(mean): --  RR: 18 (2022 05:23) (16 - 18)  SpO2: 99% (2022 05:23) (99% - 99%)    Parameters below as of 2022 05:23  Patient On (Oxygen Delivery Method): room air        Gen: NAD  breasts - nipples intact, soft  Abd: soft, NT, ND, fundus firm below umbilicus  Lochia- small rubra  Ext: no tenderness    Labs:                        10.7   x     )-----------( x        ( 2022 07:57 )             31.0     Antibody Screen: NEG ( @ 20:56)     Rubella status:    A: 32y PPD# 1 s/p      Doing well    Plan:  ppd 1 stable  doing well  breast feeding  advance care    Smita Gonzalez CNM

## 2022-07-21 NOTE — DISCHARGE NOTE OB - MEDICATION SUMMARY - MEDICATIONS TO TAKE
I will START or STAY ON the medications listed below when I get home from the hospital:    ibuprofen 600 mg oral tablet  -- 1 tab(s) by mouth every 6 hours  -- Indication: For vaginal delivery    acetaminophen 325 mg oral tablet  -- 3 tab(s) by mouth every 4 to 6 hours  -- Indication: For vaginal delivery

## 2022-07-21 NOTE — DISCHARGE NOTE OB - CARE PROVIDER_API CALL
Tana Orozco)  Obstetrics and Gynecology  [address] [place] [address]  Phone: [phone]  Fax: [phone]  Follow Up Time:

## 2022-07-23 ENCOUNTER — INPATIENT (INPATIENT)
Facility: HOSPITAL | Age: 32
LOS: 1 days | Discharge: HOME | DRG: 776 | End: 2022-07-25
Attending: INTERNAL MEDICINE | Admitting: INTERNAL MEDICINE
Payer: COMMERCIAL

## 2022-07-23 VITALS
HEIGHT: 61 IN | HEART RATE: 65 BPM | SYSTOLIC BLOOD PRESSURE: 131 MMHG | DIASTOLIC BLOOD PRESSURE: 83 MMHG | WEIGHT: 149.91 LBS | RESPIRATION RATE: 18 BRPM | OXYGEN SATURATION: 99 % | TEMPERATURE: 98 F

## 2022-07-23 DIAGNOSIS — Z90.89 ACQUIRED ABSENCE OF OTHER ORGANS: Chronic | ICD-10-CM

## 2022-07-23 DIAGNOSIS — J32.9 CHRONIC SINUSITIS, UNSPECIFIED: Chronic | ICD-10-CM

## 2022-07-23 LAB
BASE EXCESS BLDV CALC-SCNC: -0.1 MMOL/L — SIGNIFICANT CHANGE UP
BASOPHILS # BLD AUTO: 0.05 K/UL — SIGNIFICANT CHANGE UP (ref 0–0.2)
BASOPHILS NFR BLD AUTO: 0.5 % — SIGNIFICANT CHANGE UP (ref 0–2)
CO2 BLDV-SCNC: 25 MMOL/L — SIGNIFICANT CHANGE UP (ref 22–26)
EOSINOPHIL # BLD AUTO: 0.28 K/UL — SIGNIFICANT CHANGE UP (ref 0–0.5)
EOSINOPHIL NFR BLD AUTO: 3 % — SIGNIFICANT CHANGE UP (ref 0–6)
HCO3 BLDV-SCNC: 24 MMOL/L — SIGNIFICANT CHANGE UP (ref 22–29)
HCT VFR BLD CALC: 32.5 % — LOW (ref 34.5–45)
HGB BLD-MCNC: 10.9 G/DL — LOW (ref 11.5–15.5)
IMM GRANULOCYTES NFR BLD AUTO: 0.2 % — SIGNIFICANT CHANGE UP (ref 0–1.5)
LYMPHOCYTES # BLD AUTO: 1.3 K/UL — SIGNIFICANT CHANGE UP (ref 1–3.3)
LYMPHOCYTES # BLD AUTO: 13.8 % — SIGNIFICANT CHANGE UP (ref 13–44)
MCHC RBC-ENTMCNC: 29.6 PG — SIGNIFICANT CHANGE UP (ref 27–34)
MCHC RBC-ENTMCNC: 33.5 GM/DL — SIGNIFICANT CHANGE UP (ref 32–36)
MCV RBC AUTO: 88.3 FL — SIGNIFICANT CHANGE UP (ref 80–100)
MONOCYTES # BLD AUTO: 0.59 K/UL — SIGNIFICANT CHANGE UP (ref 0–0.9)
MONOCYTES NFR BLD AUTO: 6.3 % — SIGNIFICANT CHANGE UP (ref 2–14)
NEUTROPHILS # BLD AUTO: 7.18 K/UL — SIGNIFICANT CHANGE UP (ref 1.8–7.4)
NEUTROPHILS NFR BLD AUTO: 76.2 % — SIGNIFICANT CHANGE UP (ref 43–77)
PCO2 BLDV: 36 MMHG — LOW (ref 39–42)
PH BLDV: 7.43 — SIGNIFICANT CHANGE UP (ref 7.32–7.43)
PLATELET # BLD AUTO: 129 K/UL — LOW (ref 150–400)
PO2 BLDV: 302 MMHG — SIGNIFICANT CHANGE UP
RBC # BLD: 3.68 M/UL — LOW (ref 3.8–5.2)
RBC # FLD: 14 % — SIGNIFICANT CHANGE UP (ref 10.3–14.5)
SAO2 % BLDV: 99.9 % — SIGNIFICANT CHANGE UP
WBC # BLD: 9.42 K/UL — SIGNIFICANT CHANGE UP (ref 3.8–10.5)
WBC # FLD AUTO: 9.42 K/UL — SIGNIFICANT CHANGE UP (ref 3.8–10.5)

## 2022-07-23 PROCEDURE — 93010 ELECTROCARDIOGRAM REPORT: CPT

## 2022-07-23 PROCEDURE — 71046 X-RAY EXAM CHEST 2 VIEWS: CPT | Mod: 26

## 2022-07-23 PROCEDURE — 99285 EMERGENCY DEPT VISIT HI MDM: CPT

## 2022-07-23 NOTE — ED ADULT TRIAGE NOTE - CHIEF COMPLAINT QUOTE
patient 4 days post partum c/o SOB and HTN sent to ED for evaluation.  as per L&D patient needs to be evaluated in ED r/o PE.

## 2022-07-23 NOTE — ED PROVIDER NOTE - PROGRESS NOTE DETAILS
Carlito HILL: all results d/w patient. of note- patient is breastfeeding and concerned about medications excreted in breast milk. spoke with Pharmacist, little impact on the baby with only some excretion in milk but can reduce overall production of milk. will d/w patient.  sign out given to Dr. Wade, Hospitalist, MONIKA. Spoke with OB to update. they sill sign out to her team in the morning to follow.

## 2022-07-23 NOTE — ED PROVIDER NOTE - OBJECTIVE STATEMENT
33 y/o female with a PMHx of asthma,  s/p  on  to healthy baby, discharged 2 days ago. Pt reports feeling some SOB, especially when laying flat, on L side and while breat feeding. Denies chest pain or cough, no fevers ,no swelling of her legs. did not have similar experiences  with her previous deliveries, no hx of pre-eclampsia, no headache, no blurry vision.

## 2022-07-23 NOTE — ED PROVIDER NOTE - CLINICAL SUMMARY MEDICAL DECISION MAKING FREE TEXT BOX
32 with SOB s/p vaginal delivery. Will rule out PE, fluid overload, viral illness and pneumonia. 32F with SOB s/p vaginal delivery. Will rule out PE, fluid overload, viral illness and pneumonia.

## 2022-07-23 NOTE — ED PROVIDER NOTE - NS ED MD DISPO ADMITTING SERVICE
[Auscultation Breath Sounds / Voice Sounds] : lungs were clear to auscultation bilaterally [Heart Rate And Rhythm] : heart rate was normal and rhythm regular [Heart Sounds] : normal S1 and S2 [Heart Sounds Gallop] : no gallops [Murmurs] : no murmurs [Heart Sounds Pericardial Friction Rub] : no pericardial rub [Examination Of The Chest] : the chest was normal in appearance [Chest Visual Inspection Thoracic Asymmetry] : no chest asymmetry [Diminished Respiratory Excursion] : normal chest expansion [No CVA Tenderness] : no ~M costovertebral angle tenderness [No Spinal Tenderness] : no spinal tenderness [Abnormal Walk] : normal gait [Nail Clubbing] : no clubbing  or cyanosis of the fingernails [Musculoskeletal - Swelling] : no joint swelling seen [Motor Tone] : muscle strength and tone were normal [Skin Color & Pigmentation] : normal skin color and pigmentation [Skin Turgor] : normal skin turgor [] : no rash [Oriented To Time, Place, And Person] : oriented to person, place, and time [Impaired Insight] : insight and judgment were intact [Affect] : the affect was normal MED

## 2022-07-23 NOTE — ED ADULT NURSE NOTE - OBJECTIVE STATEMENT
Pt. is A&Ox3, presenting to the ER with c/o shortness of breath. Pt. reports "I am 4 days post partum, was discharged from the hospital on Thursday. On Friday I began to feel short of breath when I would lay down. Saturday I still felt short of breath when laying down but I started to feel it while I was nursing or rocking my 2 year old daughter. I also started taking my blood pressure and it was elevated. Pt. is A&Ox3, presenting to the ER with c/o shortness of breath. Pt. reports "I am 4 days post partum, was discharged from the hospital on Thursday. On Friday I began to feel short of breath when I would lay down. Saturday I still felt short of breath when laying down but I started to feel it while I was nursing or rocking my 2 year old daughter. I also started taking my blood pressure and it was elevated. I thought I should come to be evaluated." Denies CP, fevers, N/V, blurred vision, headache, lightheaded or dizzy. Pt. is A&Ox3, presenting to the ER with c/o shortness of breath. Pt. reports "I am 4 days post partum, was discharged from the hospital on Thursday. On Friday I began to feel short of breath when I would lay down. Saturday I still felt short of breath when laying down but I started to feel it while I was nursing or rocking my 2 year old daughter. I also started taking my blood pressure and it was elevated. I thought I should come to be evaluated." Denies CP, fevers, N/V, blurred vision, headache, lightheaded or dizzy. No medications taken prior to arrival. Patient had a vaginal delivery with manual removal of the placenta.

## 2022-07-23 NOTE — ED PROVIDER NOTE - NSICDXPASTSURGICALHX_GEN_ALL_CORE_FT
PAST SURGICAL HISTORY:  S/P tonsillectomy and adenoidectomy 2002    Sinusitis with nasal polyps s/p removal of nasal polyps 2003

## 2022-07-24 DIAGNOSIS — I50.31 ACUTE DIASTOLIC (CONGESTIVE) HEART FAILURE: ICD-10-CM

## 2022-07-24 DIAGNOSIS — I50.9 HEART FAILURE, UNSPECIFIED: ICD-10-CM

## 2022-07-24 LAB
ADD ON TEST-SPECIMEN IN LAB: SIGNIFICANT CHANGE UP
ADD ON TEST-SPECIMEN IN LAB: SIGNIFICANT CHANGE UP
ALBUMIN SERPL ELPH-MCNC: 2.7 G/DL — LOW (ref 3.3–5)
ALP SERPL-CCNC: 136 U/L — HIGH (ref 40–120)
ALT FLD-CCNC: 193 U/L — HIGH (ref 12–78)
ANION GAP SERPL CALC-SCNC: 6 MMOL/L — SIGNIFICANT CHANGE UP (ref 5–17)
APPEARANCE UR: CLEAR — SIGNIFICANT CHANGE UP
AST SERPL-CCNC: 154 U/L — HIGH (ref 15–37)
BILIRUB SERPL-MCNC: 0.2 MG/DL — SIGNIFICANT CHANGE UP (ref 0.2–1.2)
BILIRUB UR-MCNC: NEGATIVE — SIGNIFICANT CHANGE UP
BUN SERPL-MCNC: 33 MG/DL — HIGH (ref 7–23)
CALCIUM SERPL-MCNC: 9.3 MG/DL — SIGNIFICANT CHANGE UP (ref 8.5–10.1)
CERULOPLASMIN SERPL-MCNC: 48 MG/DL — HIGH (ref 16–45)
CHLORIDE SERPL-SCNC: 109 MMOL/L — HIGH (ref 96–108)
CO2 SERPL-SCNC: 22 MMOL/L — SIGNIFICANT CHANGE UP (ref 22–31)
COLOR SPEC: YELLOW — SIGNIFICANT CHANGE UP
CREAT SERPL-MCNC: 1.22 MG/DL — SIGNIFICANT CHANGE UP (ref 0.5–1.3)
CRP SERPL-MCNC: 70 MG/L — HIGH
D DIMER BLD IA.RAPID-MCNC: 544 NG/ML DDU — HIGH
DIFF PNL FLD: ABNORMAL
EGFR: 60 ML/MIN/1.73M2 — SIGNIFICANT CHANGE UP
ERYTHROCYTE [SEDIMENTATION RATE] IN BLOOD: 49 MM/HR — HIGH (ref 0–15)
FERRITIN SERPL-MCNC: 64 NG/ML — SIGNIFICANT CHANGE UP (ref 15–150)
FLUAV AG NPH QL: SIGNIFICANT CHANGE UP
FLUBV AG NPH QL: SIGNIFICANT CHANGE UP
GLUCOSE SERPL-MCNC: 94 MG/DL — SIGNIFICANT CHANGE UP (ref 70–99)
GLUCOSE UR QL: NEGATIVE — SIGNIFICANT CHANGE UP
HAV IGM SER-ACNC: SIGNIFICANT CHANGE UP
HBV SURFACE AB SER-ACNC: SIGNIFICANT CHANGE UP
HBV SURFACE AG SER-ACNC: SIGNIFICANT CHANGE UP
HCV AB S/CO SERPL IA: 0.08 S/CO — SIGNIFICANT CHANGE UP (ref 0–0.99)
HCV AB SERPL-IMP: SIGNIFICANT CHANGE UP
IRON SATN MFR SERPL: 30 UG/DL — SIGNIFICANT CHANGE UP (ref 30–160)
IRON SATN MFR SERPL: 6 % — LOW (ref 14–50)
KETONES UR-MCNC: NEGATIVE — SIGNIFICANT CHANGE UP
LEUKOCYTE ESTERASE UR-ACNC: ABNORMAL
NITRITE UR-MCNC: NEGATIVE — SIGNIFICANT CHANGE UP
NT-PROBNP SERPL-SCNC: 5470 PG/ML — HIGH (ref 0–125)
PH UR: 5 — SIGNIFICANT CHANGE UP (ref 5–8)
POTASSIUM SERPL-MCNC: 3.8 MMOL/L — SIGNIFICANT CHANGE UP (ref 3.5–5.3)
POTASSIUM SERPL-SCNC: 3.8 MMOL/L — SIGNIFICANT CHANGE UP (ref 3.5–5.3)
PROT SERPL-MCNC: 5.8 GM/DL — LOW (ref 6–8.3)
PROT UR-MCNC: NEGATIVE — SIGNIFICANT CHANGE UP
RHEUMATOID FACT SERPL-ACNC: <10 IU/ML — SIGNIFICANT CHANGE UP (ref 0–13)
RSV RNA NPH QL NAA+NON-PROBE: SIGNIFICANT CHANGE UP
SARS-COV-2 RNA SPEC QL NAA+PROBE: SIGNIFICANT CHANGE UP
SODIUM SERPL-SCNC: 137 MMOL/L — SIGNIFICANT CHANGE UP (ref 135–145)
SP GR SPEC: 1 — LOW (ref 1.01–1.02)
TIBC SERPL-MCNC: 476 UG/DL — HIGH (ref 220–430)
TROPONIN I, HIGH SENSITIVITY RESULT: 134.46 NG/L — HIGH
TROPONIN I, HIGH SENSITIVITY RESULT: 174.96 NG/L — HIGH
UIBC SERPL-MCNC: 446 UG/DL — HIGH (ref 110–370)
UROBILINOGEN FLD QL: NEGATIVE — SIGNIFICANT CHANGE UP

## 2022-07-24 PROCEDURE — 93306 TTE W/DOPPLER COMPLETE: CPT | Mod: 26

## 2022-07-24 PROCEDURE — 99222 1ST HOSP IP/OBS MODERATE 55: CPT

## 2022-07-24 PROCEDURE — 86706 HEP B SURFACE ANTIBODY: CPT

## 2022-07-24 PROCEDURE — 82390 ASSAY OF CERULOPLASMIN: CPT

## 2022-07-24 PROCEDURE — 86803 HEPATITIS C AB TEST: CPT

## 2022-07-24 PROCEDURE — 86709 HEPATITIS A IGM ANTIBODY: CPT

## 2022-07-24 PROCEDURE — 83540 ASSAY OF IRON: CPT

## 2022-07-24 PROCEDURE — 86140 C-REACTIVE PROTEIN: CPT

## 2022-07-24 PROCEDURE — 82728 ASSAY OF FERRITIN: CPT

## 2022-07-24 PROCEDURE — 76700 US EXAM ABDOM COMPLETE: CPT

## 2022-07-24 PROCEDURE — 99223 1ST HOSP IP/OBS HIGH 75: CPT

## 2022-07-24 PROCEDURE — 0241U: CPT

## 2022-07-24 PROCEDURE — 86431 RHEUMATOID FACTOR QUANT: CPT

## 2022-07-24 PROCEDURE — 83550 IRON BINDING TEST: CPT

## 2022-07-24 PROCEDURE — 76700 US EXAM ABDOM COMPLETE: CPT | Mod: 26

## 2022-07-24 PROCEDURE — 84484 ASSAY OF TROPONIN QUANT: CPT

## 2022-07-24 PROCEDURE — 86039 ANTINUCLEAR ANTIBODIES (ANA): CPT

## 2022-07-24 PROCEDURE — 85652 RBC SED RATE AUTOMATED: CPT

## 2022-07-24 PROCEDURE — 81001 URINALYSIS AUTO W/SCOPE: CPT

## 2022-07-24 PROCEDURE — 80048 BASIC METABOLIC PNL TOTAL CA: CPT

## 2022-07-24 PROCEDURE — 80076 HEPATIC FUNCTION PANEL: CPT

## 2022-07-24 PROCEDURE — 87340 HEPATITIS B SURFACE AG IA: CPT

## 2022-07-24 PROCEDURE — 71275 CT ANGIOGRAPHY CHEST: CPT | Mod: 26,MA

## 2022-07-24 PROCEDURE — 93306 TTE W/DOPPLER COMPLETE: CPT

## 2022-07-24 PROCEDURE — 36415 COLL VENOUS BLD VENIPUNCTURE: CPT

## 2022-07-24 RX ORDER — IBUPROFEN 200 MG
600 TABLET ORAL EVERY 8 HOURS
Refills: 0 | Status: COMPLETED | OUTPATIENT
Start: 2022-07-24 | End: 2022-07-25

## 2022-07-24 RX ORDER — ACETAMINOPHEN 500 MG
650 TABLET ORAL EVERY 6 HOURS
Refills: 0 | Status: DISCONTINUED | OUTPATIENT
Start: 2022-07-24 | End: 2022-07-25

## 2022-07-24 RX ORDER — ASPIRIN/CALCIUM CARB/MAGNESIUM 324 MG
324 TABLET ORAL ONCE
Refills: 0 | Status: COMPLETED | OUTPATIENT
Start: 2022-07-24 | End: 2022-07-24

## 2022-07-24 RX ORDER — FUROSEMIDE 40 MG
20 TABLET ORAL ONCE
Refills: 0 | Status: COMPLETED | OUTPATIENT
Start: 2022-07-24 | End: 2022-07-24

## 2022-07-24 RX ORDER — ACETAMINOPHEN 500 MG
650 TABLET ORAL ONCE
Refills: 0 | Status: COMPLETED | OUTPATIENT
Start: 2022-07-24 | End: 2022-07-24

## 2022-07-24 RX ORDER — FUROSEMIDE 40 MG
40 TABLET ORAL
Refills: 0 | Status: DISCONTINUED | OUTPATIENT
Start: 2022-07-24 | End: 2022-07-25

## 2022-07-24 RX ORDER — ENOXAPARIN SODIUM 100 MG/ML
40 INJECTION SUBCUTANEOUS EVERY 24 HOURS
Refills: 0 | Status: DISCONTINUED | OUTPATIENT
Start: 2022-07-24 | End: 2022-07-25

## 2022-07-24 RX ADMIN — ENOXAPARIN SODIUM 40 MILLIGRAM(S): 100 INJECTION SUBCUTANEOUS at 18:14

## 2022-07-24 RX ADMIN — Medication 1 TABLET(S): at 22:32

## 2022-07-24 RX ADMIN — Medication 40 MILLIGRAM(S): at 18:14

## 2022-07-24 RX ADMIN — Medication 324 MILLIGRAM(S): at 02:24

## 2022-07-24 RX ADMIN — Medication 600 MILLIGRAM(S): at 23:10

## 2022-07-24 RX ADMIN — Medication 650 MILLIGRAM(S): at 06:45

## 2022-07-24 RX ADMIN — Medication 600 MILLIGRAM(S): at 22:32

## 2022-07-24 RX ADMIN — Medication 20 MILLIGRAM(S): at 02:25

## 2022-07-24 RX ADMIN — Medication 650 MILLIGRAM(S): at 07:00

## 2022-07-24 RX ADMIN — Medication 650 MILLIGRAM(S): at 00:21

## 2022-07-24 NOTE — H&P ADULT - ASSESSMENT
  * SOB suspect acute postpartum HF with preserved EF  Lasix IV, she noticed marked improvement after it  TTE with normal findings so far  will need f/up TTE in a week  pending cardio and Pulm consult; I did not start IV abx doubt PNA; no cough or hemoptysis either.  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

## 2022-07-24 NOTE — CONSULT NOTE ADULT - SUBJECTIVE AND OBJECTIVE BOX
HPI:    32y old  Female who presents with a chief complaint of dyspnea. Patient is currently two days post-partum normal delivery. Patient has noted increasing dyspnea. Came to ER. Noted to have elevated BNP, and fluid on chest CT (with no evidence of PE). Never had prior symptoms. Liver enzymes mildly elevated. No prior history of liver disease. Pt felt better with IV LAsix,  pt c/o orthopnea - unable to lie flat w/o SOB., which is worsened after delivery, No rapid weight gain, no LE edema, no ab swelling. No chest pain, palpitations, syncope or other symptoms. pat seen for pulmonary eval pat h/o covid 2021, with slow recovery, no previous h/o of lung diseas, h/o silicone breast implant 6 years, three full term pregnancy since than no problem during first two pregnanct, no smoking, vaping & weeds used. ON RA no desturation.    PAST MEDICAL & SURGICAL HISTORY:  Asthma  Exercise  induced      Radius fracture  right      S/P tonsillectomy and adenoidectomy        Sinusitis with nasal polyps  s/p removal of nasal polyps 2003          Home Medications:  acetaminophen 325 mg oral tablet: 3 tab(s) orally every 4 to 6 hours (2022 10:06)  ibuprofen 600 mg oral tablet: 1 tab(s) orally every 6 hours (2022 10:06)      MEDICATIONS  (STANDING):  enoxaparin Injectable 40 milliGRAM(s) SubCutaneous every 24 hours  furosemide   Injectable 40 milliGRAM(s) IV Push two times a day    MEDICATIONS  (PRN):  acetaminophen     Tablet .. 650 milliGRAM(s) Oral every 6 hours PRN Mild Pain (1 - 3), Moderate Pain (4 - 6)      Allergies    No Known Allergies    Intolerances        SOCIAL HISTORY: Denies tobacco, etoh abuse or illicit drug use    FAMILY HISTORY:      Vital Signs Last 24 Hrs  T(C): 37 (2022 10:46), Max: 37 (2022 10:46)  T(F): 98.6 (2022 10:46), Max: 98.6 (2022 10:46)  HR: 76 (2022 20:00) (51 - 95)  BP: 120/81 (2022 20:00) (120/81 - 141/88)  BP(mean): 94 (2022 20:00) (89 - 105)  RR: 20 (2022 20:00) (17 - 23)  SpO2: 96% (2022 20:00) (94% - 99%)    Parameters below as of 2022 20:00  Patient On (Oxygen Delivery Method): room air            REVIEW OF SYSTEMS:    CONSTITUTIONAL:  As per HPI.  HEENT:  Eyes:  No diplopia or blurred vision. ENT:  No earache, sore throat or runny nose.  CARDIOVASCULAR:  No pressure, squeezing, tightness, heaviness or aching about the chest, neck, axilla or epigastrium.  RESPIRATORY:  No cough, +shortness of breath, PND or orthopnea.  GASTROINTESTINAL:  No nausea, vomiting or diarrhea.  GENITOURINARY:  No dysuria, frequency or urgency.  MUSCULOSKELETAL:  As per HPI.  SKIN:  No change in skin, hair or nails.  NEUROLOGIC:  No paresthesias, fasciculations, seizures or weakness.  PSYCHIATRIC:  No disorder of thought or mood.  ENDOCRINE:  No heat or cold intolerance, polyuria or polydipsia.  HEMATOLOGICAL:  No easy bruising or bleedings:  .     PHYSICAL EXAMINATION:    GENERAL APPEARANCE:  Pt. is not currently dyspneic, in no distress. Pt. is alert, oriented, and pleasant.  HEENT:  Pupils are normal and react normally. No icterus. Mucous membranes well colored.  NECK:  Supple. No lymphadenopathy. Jugular venous pressure not elevated. Carotids equal.   HEART:   The cardiac impulse has a normal quality. Regular. Normal S1 and S2. There are no murmurs, rubs or gallops noted  CHEST:  Chest crackles to auscultation. Normal respiratory effort.  ABDOMEN:  Soft and nontender.   EXTREMITIES:  There is no cyanosis, clubbing or edema.   SKIN:  No rash or significant lesions are noted.    LABS:                        10.9   9.42  )-----------( 129      ( 2022 23:38 )             32.5     07-23    137  |  109<H>  |  33<H>  ----------------------------<  94  3.8   |  22  |  1.22    Ca    9.3      2022 23:38  Mg     1.7     07-23    TPro  5.8<L>  /  Alb  2.7<L>  /  TBili  0.2  /  DBili  x   /  AST  154<H>  /  ALT  193<H>  /  AlkPhos  136<H>  -    LIVER FUNCTIONS - ( 2022 23:38 )  Alb: 2.7 g/dL / Pro: 5.8 gm/dL / ALK PHOS: 136 U/L / ALT: 193 U/L / AST: 154 U/L / GGT: x                 Urinalysis Basic - ( 2022 09:00 )    Color: Yellow / Appearance: Clear / S.005 / pH: x  Gluc: x / Ketone: Negative  / Bili: Negative / Urobili: Negative   Blood: x / Protein: Negative / Nitrite: Negative   Leuk Esterase: Trace / RBC: 6-10 /HPF / WBC 0-2   Sq Epi: x / Non Sq Epi: Few / Bacteria: Negative            RADIOLOGY & ADDITIONAL STUDIES:   CT Angio Chest PE Protocol w/ IV Cont (22 @ 00:45) >  LUNGS AND AIRWAYS: Patent central airways.  Patchy bilateral nodular   opacities of the right lung are appreciated. There is small nodular   opacities also seen at the left lower lobe. There is intralobular septal   thickening noted bilaterally.  PLEURA: There is a trace left pleural effusion and a small right-sided   pleural effusion.  MEDIASTINUM AND BRIANA: No lymphadenopathy.  VESSELS: The aorta and pulmonary artery are of normal caliber. There are   no filling defects seen within the opacified pulmonary arterial tree.  HEART: Heart size is normal. No pericardial effusion.  CHEST WALL AND LOWER NECK: There are bilateral breast implants   appreciated.  VISUALIZED UPPER ABDOMEN: Within normal limits.  BONES: Within normal limits.    IMPRESSION:  Small right pleural effusion and trace pleural effusion.    Patchy nodular opacities, predominantly of the right lung though with   smaller peripheral nodules seen of the left lung. Differential   considerations include multifocal infection, parenchymal edema,   hemorrhage or other alveolar process. Interlobular septal thickening   suggestive of edema.    No pulmonary embolus.    US Abdomen Complete (US Abdomen Complete .) (22 @ 12:11) >  IMPRESSION:  Trace right pleural effusion, otherwise unremarkable abdominal ultrasound.      
Patient is a 32y old  Female who presents with a chief complaint of dyspnea. Patient is currently two days portpartum, normal delivery. Patient has noted increasing dyspnea. Came to ER. Noted to have elevated BNP, and fluid on chest CT (with no evidence of PE). Never had prior symptoms. Liver enzymes mildly elevated. No prior history of liver disease.     HPI:      PAST MEDICAL & SURGICAL HISTORY:  Asthma  Exercise  induced      Radius fracture  right      S/P tonsillectomy and adenoidectomy  2002      Sinusitis with nasal polyps  s/p removal of nasal polyps 2003          MEDICATIONS  (STANDING):  enoxaparin Injectable 40 milliGRAM(s) SubCutaneous every 24 hours    MEDICATIONS  (PRN):  acetaminophen     Tablet .. 650 milliGRAM(s) Oral every 6 hours PRN Mild Pain (1 - 3), Moderate Pain (4 - 6)      Allergies    No Known Allergies    Intolerances        SOCIAL HISTORY:    FAMILY HISTORY:        Vital Signs Last 24 Hrs  T(C): 36.6 (24 Jul 2022 06:20), Max: 36.7 (23 Jul 2022 21:59)  T(F): 97.8 (24 Jul 2022 06:20), Max: 98.1 (23 Jul 2022 21:59)  HR: 56 (24 Jul 2022 08:00) (51 - 95)  BP: 123/79 (24 Jul 2022 08:00) (123/79 - 141/88)  BP(mean): 93 (24 Jul 2022 08:00) (89 - 105)  RR: 23 (24 Jul 2022 08:00) (17 - 23)  SpO2: 94% (24 Jul 2022 08:00) (94% - 99%)    Parameters below as of 24 Jul 2022 08:00  Patient On (Oxygen Delivery Method): room air        PHYSICAL EXAM:    Respiratory: CTAB  Cardiovascular: S1 and S2, RRR, no M/G/R  Gastrointestinal: BS+, soft, NT/ND      LABS:                        10.9   9.42  )-----------( 129      ( 23 Jul 2022 23:38 )             32.5     07-23    137  |  109<H>  |  33<H>  ----------------------------<  94  3.8   |  22  |  1.22    Ca    9.3      23 Jul 2022 23:38  Mg     1.7     07-23    TPro  5.8<L>  /  Alb  2.7<L>  /  TBili  0.2  /  DBili  x   /  AST  154<H>  /  ALT  193<H>  /  AlkPhos  136<H>  07-23      LIVER FUNCTIONS - ( 23 Jul 2022 23:38 )  Alb: 2.7 g/dL / Pro: 5.8 gm/dL / ALK PHOS: 136 U/L / ALT: 193 U/L / AST: 154 U/L / GGT: x             RADIOLOGY & ADDITIONAL STUDIES:
32y old  Female who presents with a chief complaint of dyspnea.   Patient is currently two days post-partum normal delivery.   Patient has noted increasing dyspnea. Came to ER.   Noted to have elevated BNP, and fluid on chest CT (with no evidence of PE).   Never had prior symptoms. Liver enzymes mildly elevated. No prior history of liver disease.   TTE done today, nl EF. Pt felt better with IV LAsix (24 Jul 2022 14:01)    consulted for CHF.  Reviewed symptoms.  Presented to ED b/c felt she could not get a deep breath in.  On questioning, pt reports orthopnea - unable to lie flat w/o SOB.  No rapid weight gain, no LE edema, no ab swelling.  No chest pain, palpitations, syncope or other symptoms.  Recent delivery was unremarkable.    PAST MEDICAL AND SURGICAL HISTORY:  PAST MEDICAL & SURGICAL HISTORY:  Asthma  Exercise  induced      Radius fracture  right      S/P tonsillectomy and adenoidectomy  2002      Sinusitis with nasal polyps  s/p removal of nasal polyps 2003          ALLERGIES:  Allergies    No Known Allergies    Intolerances        SOCIAL HISTORY:  Social History:  neg smoking etoh idu (24 Jul 2022 14:01)      FAMILY  HISTORY:  FAMILY HISTORY:      MEDICATIONS:  OUTPATIENT:  Home Medications:  acetaminophen 325 mg oral tablet: 3 tab(s) orally every 4 to 6 hours (21 Jul 2022 10:06)  ibuprofen 600 mg oral tablet: 1 tab(s) orally every 6 hours (21 Jul 2022 10:06)      INPATIENT:  MEDICATIONS  (STANDING):  enoxaparin Injectable 40 milliGRAM(s) SubCutaneous every 24 hours  furosemide   Injectable 40 milliGRAM(s) IV Push two times a day    MEDICATIONS  (PRN):  acetaminophen     Tablet .. 650 milliGRAM(s) Oral every 6 hours PRN Mild Pain (1 - 3), Moderate Pain (4 - 6)    MEDICATIONS  (PRN):  acetaminophen     Tablet .. 650 milliGRAM(s) Oral every 6 hours PRN Mild Pain (1 - 3), Moderate Pain (4 - 6)      REVIEW OF SYSTEMS:  ===============================  ===============================  CONSTITUTIONAL: No weakness, fevers or chills  EYES/ENT: No visual changes;  No vertigo or throat pain   NECK: No pain or stiffness  RESPIRATORY: No cough, wheezing, hemoptysis; No shortness of breath  CARDIOVASCULAR: No chest pain or palpitations  GASTROINTESTINAL: No abdominal or epigastric pain. No nausea, vomiting, or hematemesis;   No diarrhea or constipation. No melena or hematochezia.  GENITOURINARY: No dysuria, frequency or hematuria  NEUROLOGICAL: No numbness or weakness  SKIN: No itching, burning, rashes, or lesions   All other review of systems is negative unless indicated above    Vital Signs Last 24 Hrs  T(C): 37 (24 Jul 2022 10:46), Max: 37 (24 Jul 2022 10:46)  T(F): 98.6 (24 Jul 2022 10:46), Max: 98.6 (24 Jul 2022 10:46)  HR: 75 (24 Jul 2022 14:00) (51 - 95)  BP: 132/78 (24 Jul 2022 12:00) (123/79 - 141/88)  BP(mean): 95 (24 Jul 2022 12:00) (89 - 105)  RR: 19 (24 Jul 2022 14:00) (17 - 23)  SpO2: 98% (24 Jul 2022 14:00) (94% - 99%)    Parameters below as of 24 Jul 2022 08:00  Patient On (Oxygen Delivery Method): room air        I&O's Summary    23 Jul 2022 07:01  -  24 Jul 2022 07:00  --------------------------------------------------------  IN: 0 mL / OUT: 650 mL / NET: -650 mL    24 Jul 2022 07:01  -  24 Jul 2022 17:40  --------------------------------------------------------  IN: 0 mL / OUT: 1000 mL / NET: -1000 mL        I&O's Detail    23 Jul 2022 07:01  -  24 Jul 2022 07:00  --------------------------------------------------------  IN:  Total IN: 0 mL    OUT:    Voided (mL): 650 mL  Total OUT: 650 mL    Total NET: -650 mL      24 Jul 2022 07:01  -  24 Jul 2022 17:40  --------------------------------------------------------  IN:  Total IN: 0 mL    OUT:    Voided (mL): 1000 mL  Total OUT: 1000 mL    Total NET: -1000 mL          PHYSICAL EXAM:    Constitutional: NAD, awake and alert, well-developed  HEENT: PERR, EOMI,  No oral cyananosis.  Neck:  supple,  No JVD  Respiratory: Breath sounds are clear bilaterally, No wheezing, rales or rhonchi  Cardiovascular: S1 and S2, regular rate and rhythm, no Murmurs, gallops or rubs  Gastrointestinal: Bowel Sounds present, soft, nontender.   Extremities: No peripheral edema. No clubbing or cyanosis.  Vascular: 2+ peripheral pulses  Neurological: A/O x 3, no focal deficits  Musculoskeletal: no calf tenderness.  Skin: No rashes.    ===============================  ===============================  LABS:                         10.9   9.42  )-----------( 129      ( 23 Jul 2022 23:38 )             32.5     23 Jul 2022 23:38    137    |  109    |  33     ----------------------------<  94     3.8     |  22     |  1.22     Ca    9.3        23 Jul 2022 23:38  Mg     1.7       23 Jul 2022 23:38    TPro  5.8    /  Alb  2.7    /  TBili  0.2    /  DBili  x      /  AST  154    /  ALT  193    /  AlkPhos  136    23 Jul 2022 23:38        THYROID STUDIES:    ===============================  ===============================  CARDIAC BIOMARKERS:  -------  -BNP VALUES:  07-23 @ 23:38  Pro Bnp 5470    -------  -TROPONIN VALUES:   Troponin I, High Sensitivity Result: 174.96 ng/L *H* (07-24-22 @ 05:43)  Troponin I, High Sensitivity Result: 134.46 ng/L *H* (07-23-22 @ 23:38)  ===============================  ===============================  EKG: sinus alicia possible LAE    TELE: no events.  ===============================  RADIOLOGY:    ACC: [id number] EXAM:  [facility]                          PROCEDURE DATE:  07/24/2022          INTERPRETATION:  CLINICAL INFORMATION: Shortness of breath    COMPARISON: None.    CONTRAST/COMPLICATIONS:  IV Contrast: Omnipaque [id number] cc administered   10 cc discarded  Oral Contrast: NONE  Complications: None reported at time of study completion    PROCEDURE:  CT Angiography of the Chest.  Sagittal and coronal reformats were performed as well as 3D (MIP)   reconstructions.    FINDINGS:    LUNGS AND AIRWAYS: Patent central airways.  Patchy bilateral nodular   opacities of the right lung are appreciated. There is small nodular   opacities also seen at the left lower lobe. There is intralobular septal   thickening noted bilaterally.  PLEURA: There is a trace left pleural effusion and a small right-sided   pleural effusion.  MEDIASTINUM AND BRIANA: No lymphadenopathy.  VESSELS: The aorta and pulmonary artery are of normal caliber. There are   no filling defects seen within the opacified pulmonary arterial tree.  HEART: Heart size is normal. No pericardial effusion.  CHEST WALL AND LOWER NECK: There are bilateral breast implants   appreciated.  VISUALIZED UPPER ABDOMEN: Within normal limits.  BONES: Within normal limits.    IMPRESSION:  Small right pleural effusion and trace pleural effusion.    Patchy nodular opacities, predominantly of the right lung though with   smaller peripheral nodules seen of the left lung. Differential   considerations include multifocal infection, parenchymal edema,   hemorrhage or other alveolar process. Interlobular septal thickening   suggestive of edema.    No pulmonary embolus.      --- End of Report ---    ZENY DUKES MD; Attending Radiologist  This document has been electronically signed. Jul 24 2022  1:05AM    -----------------      ACC: [id number] EXAM:  XR CHEST PA LAT 2V                          PROCEDURE DATE:  07/23/2022          INTERPRETATION:  CLINICAL INDICATION: Chest Pain    TECHNIQUE: Frontal and lateral chest radiographs on 7/23/2022 11:08 PM    COMPARISON: None.    FINDINGS:    Heart size may be exaggerated by AP technique. Pulmonary vascular   congestion. Hazy right infrahilar lung opacity. Small right pleural   effusion.    Osseous structures are within normal limits.      IMPRESSION:    Pulmonary vascular congestion. Hazy right infrahilar lung opacity. Small   right pleural effusion.    --- End of Report ---            PACO CLEANING MD; Attending Radiologist  This document has been electronically signed. Jul 24 2022  8:52AM  ===============================  ECHO:  Prelim: normal EF, small area of RWMA at basal anteroseptal region.   normal diastolic function, normal LA, normal PASP  ===============================  ===============================    Malik Galvan M.D.  Cardiology, [facility]  Cell: [phone]  Office:   [phone] ([facility] Office)  [phone] ([facility] Office)    ===============================

## 2022-07-24 NOTE — PATIENT PROFILE ADULT - FUNCTIONAL SCREEN CURRENT LEVEL: SWALLOWING (IF SCORE 2 OR MORE FOR ANY ITEM, CONSULT REHAB SERVICES), MLM)
Have Your Skin Lesions Been Treated?: not been treated Is This A New Presentation, Or A Follow-Up?: Skin Lesions How Severe Is Your Skin Lesion?: moderate 0 = swallows foods/liquids without difficulty

## 2022-07-24 NOTE — CONSULT NOTE ADULT - PROBLEM SELECTOR RECOMMENDATION 9
-Orthopnea by history.  CXR & CT w/ pulmonary vascular congestion.  BNP 3500.  Echo w/ preserved EF w/ small area of hypokinesis.  Responding to IV diuretic therapy.  -Overall picture consistent w/ new onset diastolic dysfunction  postpartum.  Etiology unclear.  -Trop minimally elevated w/ no ischemic changes & no chest pain.  likely demand ischemia related to vascular congestion.  -Continue lasix 40 IV BID & reassess fluid status tommorrow.  -Once orthopnea resolves could followup w/ cardio in 1 week.  -Will follow.

## 2022-07-24 NOTE — PATIENT PROFILE ADULT - FALL HARM RISK - UNIVERSAL INTERVENTIONS
Bed in lowest position, wheels locked, appropriate side rails in place/Call bell, personal items and telephone in reach/Instruct patient to call for assistance before getting out of bed or chair/Non-slip footwear when patient is out of bed/[place] to call system/Physically safe environment - no spills, clutter or unnecessary equipment/Purposeful Proactive Rounding/Room/bathroom lighting operational, light cord in reach

## 2022-07-24 NOTE — CONSULT NOTE ADULT - ASSESSMENT
PROBLEMS:    SOB suspect acute postpartum HF with preserved EF  Patchy nodular opacities, predominantly of the right lung though with smaller peripheral nodules seen of the left lung. Differential   considerations include multifocal infection, parenchymal edema, hemorrhage or other alveolar process. 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    PLAN:    Lasix IV marked improvement after it  Hold IV abx doubt PNA; no cough or hemoptysis   BNP 3500.  Echo w/ preserved EF w/ small area of hypokinesis  Responding to IV diuretic therapy  Overall picture consistent w/ new onset diastolic dysfunction postpartum  Trop minimally elevated w/ no ischemic changes & no chest pain likely demand ischemia related to vascular congestion  Inflammotory markers  supportive care  would need to fu on CT   DVT prophylasix  
33 yo female recently postpartum, now with dyspnea and elevated BNP. Would be concerned about possible postpartum cardiomyopathy. Patient currently having echocardiogram. Liver enzymes may be from right sided heart failure. Will obtain serologic studies and abdominal ultrasound.

## 2022-07-24 NOTE — H&P ADULT - HISTORY OF PRESENT ILLNESS
32y old  Female who presents with a chief complaint of dyspnea. Patient is currently two days post-partum normal delivery. Patient has noted increasing dyspnea. Came to ER. Noted to have elevated BNP, and fluid on chest CT (with no evidence of PE). Never had prior symptoms. Liver enzymes mildly elevated. No prior history of liver disease. TTE done today, nl EF. Pt felt better with IV LAsix

## 2022-07-24 NOTE — H&P ADULT - NSHPLABSRESULTS_GEN_ALL_CORE
10.9   9.42  )-----------( 129      ( 23 Jul 2022 23:38 )             32.5   07-23    137  |  109<H>  |  33<H>  ----------------------------<  94  3.8   |  22  |  1.22    Ca    9.3      23 Jul 2022 23:38  Mg     1.7     07-23    TPro  5.8<L>  /  Alb  2.7<L>  /  TBili  0.2  /  DBili  x   /  AST  154<H>  /  ALT  193<H>  /  AlkPhos  136<H>  07-23      BNP 5400    < from: CT Angio Chest PE Protocol w/ IV Cont (07.24.22 @ 00:45) >    LUNGS AND AIRWAYS: Patent central airways.  Patchy bilateral nodular   opacities of the right lung are appreciated. There is small nodular   opacities also seen at the left lower lobe. There is intralobular septal   thickening noted bilaterally.  PLEURA: There is a trace left pleural effusion and a small right-sided   pleural effusion.  MEDIASTINUM AND BRIANA: No lymphadenopathy.  VESSELS: The aorta and pulmonary artery are of normal caliber. There are   no filling defects seen within the opacified pulmonary arterial tree.  HEART: Heart size is normal. No pericardial effusion.  CHEST WALL AND LOWER NECK: There are bilateral breast implants   appreciated.  VISUALIZED UPPER ABDOMEN: Within normal limits.  BONES: Within normal limits.    IMPRESSION:  Small right pleural effusion and trace pleural effusion.    Patchy nodular opacities, predominantly of the right lung though with   smaller peripheral nodules seen of the left lung. Differential   considerations include multifocal infection, parenchymal edema,   hemorrhage or other alveolar process. Interlobular septal thickening   suggestive of edema.    No pulmonary embolus.        US Abdomen Complete (US Abdomen Complete .) (07.24.22 @ 12:11) >    Trace right pleural effusion, otherwise unremarkable abdominal ultrasound.

## 2022-07-24 NOTE — H&P ADULT - NSHPPHYSICALEXAM_GEN_ALL_CORE
Vital Signs Last 24 Hrs  T(C): 37 (24 Jul 2022 10:46), Max: 37 (24 Jul 2022 10:46)  T(F): 98.6 (24 Jul 2022 10:46), Max: 98.6 (24 Jul 2022 10:46)  HR: 56 (24 Jul 2022 08:00) (51 - 95)  BP: 123/79 (24 Jul 2022 08:00) (123/79 - 141/88)  BP(mean): 93 (24 Jul 2022 08:00) (89 - 105)  RR: 23 (24 Jul 2022 08:00) (17 - 23)  SpO2: 94% (24 Jul 2022 08:00) (94% - 99%)    Parameters below as of 24 Jul 2022 08:00  Patient On (Oxygen Delivery Method): room air    · CONSTITUTIONAL: Well appearing, awake, alert, oriented to person, place, time/situation and in no apparent distress.  · ENMT: Airway patent, Nasal mucosa clear. Mouth with normal mucosa. Throat has no vesicles, no oropharyngeal exudates and uvula is midline.  · EYES: Clear bilaterally, pupils equal, round and reactive to light.  · CARDIAC: Normal rate, regular rhythm.  Heart sounds S1, S2.  No murmurs, rubs or gallops.  · RESPIRATORY: Breath sounds clear and equal bilaterally.  · GASTROINTESTINAL: Abdomen soft, non-tender, no guarding.  · MUSCULOSKELETAL: Spine appears normal, range of motion is not limited, no muscle or joint tenderness  · NEUROLOGICAL: Alert and oriented, no focal deficits, no motor or sensory deficits.  · SKIN: Skin normal color for race, warm, dry and intact. No evidence of rash.

## 2022-07-25 ENCOUNTER — TRANSCRIPTION ENCOUNTER (OUTPATIENT)
Age: 32
End: 2022-07-25

## 2022-07-25 VITALS — OXYGEN SATURATION: 98 % | SYSTOLIC BLOOD PRESSURE: 112 MMHG | DIASTOLIC BLOOD PRESSURE: 77 MMHG

## 2022-07-25 LAB
ALBUMIN SERPL ELPH-MCNC: 3.2 G/DL — LOW (ref 3.3–5)
ALP SERPL-CCNC: 166 U/L — HIGH (ref 40–120)
ALT FLD-CCNC: 185 U/L — HIGH (ref 12–78)
ANION GAP SERPL CALC-SCNC: 10 MMOL/L — SIGNIFICANT CHANGE UP (ref 5–17)
AST SERPL-CCNC: 59 U/L — HIGH (ref 15–37)
BILIRUB DIRECT SERPL-MCNC: 0.1 MG/DL — SIGNIFICANT CHANGE UP (ref 0–0.3)
BILIRUB INDIRECT FLD-MCNC: 0.4 MG/DL — SIGNIFICANT CHANGE UP (ref 0.2–1)
BILIRUB SERPL-MCNC: 0.5 MG/DL — SIGNIFICANT CHANGE UP (ref 0.2–1.2)
BUN SERPL-MCNC: 30 MG/DL — HIGH (ref 7–23)
CALCIUM SERPL-MCNC: 10.4 MG/DL — HIGH (ref 8.5–10.1)
CHLORIDE SERPL-SCNC: 96 MMOL/L — SIGNIFICANT CHANGE UP (ref 96–108)
CO2 SERPL-SCNC: 27 MMOL/L — SIGNIFICANT CHANGE UP (ref 22–31)
CREAT SERPL-MCNC: 1.11 MG/DL — SIGNIFICANT CHANGE UP (ref 0.5–1.3)
EGFR: 68 ML/MIN/1.73M2 — SIGNIFICANT CHANGE UP
GLUCOSE SERPL-MCNC: 85 MG/DL — SIGNIFICANT CHANGE UP (ref 70–99)
POTASSIUM SERPL-MCNC: 3.5 MMOL/L — SIGNIFICANT CHANGE UP (ref 3.5–5.3)
POTASSIUM SERPL-SCNC: 3.5 MMOL/L — SIGNIFICANT CHANGE UP (ref 3.5–5.3)
PROT SERPL-MCNC: 7.9 GM/DL — SIGNIFICANT CHANGE UP (ref 6–8.3)
SODIUM SERPL-SCNC: 133 MMOL/L — LOW (ref 135–145)

## 2022-07-25 PROCEDURE — 99233 SBSQ HOSP IP/OBS HIGH 50: CPT

## 2022-07-25 RX ORDER — FUROSEMIDE 40 MG
1 TABLET ORAL
Qty: 30 | Refills: 0
Start: 2022-07-25 | End: 2022-08-23

## 2022-07-25 RX ORDER — IBUPROFEN 200 MG
600 TABLET ORAL ONCE
Refills: 0 | Status: COMPLETED | OUTPATIENT
Start: 2022-07-25 | End: 2022-07-25

## 2022-07-25 RX ADMIN — Medication 1 TABLET(S): at 10:23

## 2022-07-25 RX ADMIN — Medication 650 MILLIGRAM(S): at 11:00

## 2022-07-25 RX ADMIN — Medication 600 MILLIGRAM(S): at 12:53

## 2022-07-25 RX ADMIN — Medication 650 MILLIGRAM(S): at 10:23

## 2022-07-25 RX ADMIN — Medication 600 MILLIGRAM(S): at 13:46

## 2022-07-25 RX ADMIN — Medication 600 MILLIGRAM(S): at 05:22

## 2022-07-25 RX ADMIN — Medication 600 MILLIGRAM(S): at 05:49

## 2022-07-25 NOTE — DISCHARGE NOTE PROVIDER - HOSPITAL COURSE
Terris Canavan had spoke to [facility] about getting a letter for him for massage therapy. He is asking for a call from [facility] to discuss where the letter needs to go. 32y old  Female who presents with a chief complaint of dyspnea. Patient is currently two days post-partum normal delivery. Patient has noted increasing dyspnea. Came to ER. Noted to have elevated BNP, and fluid on chest CT (with no evidence of PE). Never had prior symptoms. Liver enzymes mildly elevated. No prior history of liver disease. TTE done today, nl EF. 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

## 2022-07-25 NOTE — DISCHARGE NOTE NURSING/CASE MANAGEMENT/SOCIAL WORK - PATIENT PORTAL LINK FT
You can access the FollowMyHealth Patient Portal offered by [facility] by registering at the following website: [email or web address]. By joining [employer]’s FollowMyHealth portal, you will also be able to view your health information using other applications (apps) compatible with our system.

## 2022-07-25 NOTE — DISCHARGE NOTE PROVIDER - PROVIDER TOKENS
PROVIDER:[TOKEN:[[id number]],FOLLOWUP:[1 week]],PROVIDER:[TOKEN:[[id number]],FOLLOWUP:[1 week]]

## 2022-07-25 NOTE — DISCHARGE NOTE NURSING/CASE MANAGEMENT/SOCIAL WORK - NSDCPEFALRISK_GEN_ALL_CORE
For information on Fall & Injury Prevention, visit: [email or web address] OR  [email or web address] OR  https://www.cdc.gov/steadi/patient.html

## 2022-07-25 NOTE — PROGRESS NOTE ADULT - SUBJECTIVE AND OBJECTIVE BOX
HD#2 32y  Female PPD#6 with no significant medical history s/p uncomplicated  presented on PPD#4 w/ SOB while lying down and elevated BPs at home  S: Patient is doing well and has no acute complaints. She states her SOB has significantly improved.  She is OOB and urinating w/o any difficulties.  She is pumping breastmilk for the baby. Denies pain, swelling of her legs and states her lochia normal.     O: Vital Signs Last 24 Hrs  T(C): 36.6 (2022 09:00), Max: 37 (2022 10:46)  T(F): 97.9 (2022 09:00), Max: 98.6 (2022 10:46)  HR: 60 (2022 08:00) (55 - 76)  BP: 131/73 (2022 06:00) (120/81 - 132/78)  BP(mean): 89 (2022 06:00) (89 - 95)  RR: 18 (2022 08:00) (14 - 22)  SpO2: 94% (2022 08:00) (94% - 98%)    Parameters below as of 2022 08:00  Patient On (Oxygen Delivery Method): room air      Appears well, sitting up in bed, pumping breastmilk  speaking full sentences, not dyspneic  Abd: deferred  Perineum: deferred  PV: No calf tenderness, no edema.     Labs:                         10.9   9.42  )-----------( 129      ( 2022 23:38 )             32.5     
Subjective:    pat better, less sob, slept better last night.    MEDICATIONS  (STANDING):  enoxaparin Injectable 40 milliGRAM(s) SubCutaneous every 24 hours  furosemide   Injectable 40 milliGRAM(s) IV Push two times a day  prenatal multivitamin 1 Tablet(s) Oral daily    MEDICATIONS  (PRN):  acetaminophen     Tablet .. 650 milliGRAM(s) Oral every 6 hours PRN Mild Pain (1 - 3), Moderate Pain (4 - 6)      Allergies    No Known Allergies    Intolerances        Vital Signs Last 24 Hrs  T(C): 36.6 (2022 09:00), Max: 36.8 (2022 06:19)  T(F): 97.9 (2022 09:00), Max: 98.3 (2022 06:19)  HR: 77 (2022 10:37) (55 - 77)  BP: 112/77 (2022 13:30) (105/71 - 131/73)  BP(mean): 82 (2022 10:37) (82 - 94)  RR: 20 (2022 10:37) (14 - 20)  SpO2: 98% (2022 13:30) (94% - 99%)    Parameters below as of 2022 13:30  Patient On (Oxygen Delivery Method): room air          PHYSICAL EXAMINATION:    NECK:  Supple. No lymphadenopathy. Jugular venous pressure not elevated. Carotids equal.   HEART:   The cardiac impulse has a normal quality. Reg., Nl S1 and S2.  There are no murmurs, rubs or gallops noted  CHEST:  Chest crackles to auscultation. Normal respiratory effort.  ABDOMEN:  Soft and nontender.   EXTREMITIES:  There is no edema.       LABS:                        10.9   9.42  )-----------( 129      ( 2022 23:38 )             32.5     07-25    133<L>  |  96  |  30<H>  ----------------------------<  85  3.5   |  27  |  1.11    Ca    10.4<H>      2022 05:30  Mg     1.7     07-23    TPro  7.9  /  Alb  3.2<L>  /  TBili  0.5  /  DBili  0.1  /  AST  59<H>  /  ALT  185<H>  /  AlkPhos  166<H>  07-25      Urinalysis Basic - ( 2022 09:00 )    Color: Yellow / Appearance: Clear / S.005 / pH: x  Gluc: x / Ketone: Negative  / Bili: Negative / Urobili: Negative   Blood: x / Protein: Negative / Nitrite: Negative   Leuk Esterase: Trace / RBC: 6-10 /HPF / WBC 0-2   Sq Epi: x / Non Sq Epi: Few / Bacteria: Negative            
HPI:  32y old  Female who presents with a chief complaint of dyspnea.   Patient is currently two days post-partum normal delivery.   Patient has noted increasing dyspnea. Came to ER.   Noted to have elevated BNP, and fluid on chest CT (with no evidence of PE).   Never had prior symptoms. Liver enzymes mildly elevated. No prior history of liver disease.   TTE done today, nl EF. Pt felt better with IV LAsix (24 Jul 2022 14:01)    consulted for CHF.  Reviewed symptoms.  Presented to ED b/c felt she could not get a deep breath in.  On questioning, pt reports orthopnea - unable to lie flat w/o SOB.  No rapid weight gain, no LE edema, no ab swelling.  No chest pain, palpitations, syncope or other symptoms.  Recent delivery was unremarkable.    7/25/'22: euvolemic clinically   & on exam.    MEDICATIONS:  OUTPATIENT  Home Medications:      INPATIENT  MEDICATIONS  (STANDING):  enoxaparin Injectable 40 milliGRAM(s) SubCutaneous every 24 hours  furosemide   Injectable 40 milliGRAM(s) IV Push two times a day  prenatal multivitamin 1 Tablet(s) Oral daily    MEDICATIONS  (PRN):  acetaminophen     Tablet .. 650 milliGRAM(s) Oral every 6 hours PRN Mild Pain (1 - 3), Moderate Pain (4 - 6)          Vital Signs Last 24 Hrs  T(C): 36.6 (25 Jul 2022 09:00), Max: 36.8 (25 Jul 2022 06:19)  T(F): 97.9 (25 Jul 2022 09:00), Max: 98.3 (25 Jul 2022 06:19)  HR: 77 (25 Jul 2022 10:37) (55 - 77)  BP: 112/77 (25 Jul 2022 13:30) (105/71 - 131/73)  BP(mean): 82 (25 Jul 2022 10:37) (82 - 94)  RR: 20 (25 Jul 2022 10:37) (14 - 20)  SpO2: 98% (25 Jul 2022 13:30) (94% - 99%)    Parameters below as of 25 Jul 2022 13:30  Patient On (Oxygen Delivery Method): room air    Daily     Daily I&O's Summary    24 Jul 2022 07:01  -  25 Jul 2022 07:00  --------------------------------------------------------  IN: 0 mL / OUT: 5050 mL / NET: -5050 mL    25 Jul 2022 07:01  -  25 Jul 2022 18:15  --------------------------------------------------------  IN: 500 mL / OUT: 250 mL / NET: 250 mL        I&O's Detail    24 Jul 2022 07:01  -  25 Jul 2022 07:00  --------------------------------------------------------  IN:  Total IN: 0 mL    OUT:    Voided (mL): 5050 mL  Total OUT: 5050 mL    Total NET: -5050 mL      25 Jul 2022 07:01  -  25 Jul 2022 18:15  --------------------------------------------------------  IN:    Oral Fluid: 500 mL  Total IN: 500 mL    OUT:    Voided (mL): 250 mL  Total OUT: 250 mL    Total NET: 250 mL          I&O's Summary    24 Jul 2022 07:01  -  25 Jul 2022 07:00  --------------------------------------------------------  IN: 0 mL / OUT: 5050 mL / NET: -5050 mL    25 Jul 2022 07:01  -  25 Jul 2022 18:15  --------------------------------------------------------  IN: 500 mL / OUT: 250 mL / NET: 250 mL        PHYSICAL EXAM:    Constitutional: NAD, awake and alert, well-developed  HEENT: PERR, EOMI,  No oral cyananosis.  Neck:  supple,  No JVD  Respiratory: Breath sounds are clear bilaterally, No wheezing, rales or rhonchi  Cardiovascular: S1 and S2, regular rate and rhythm, no Murmurs, gallops or rubs  Gastrointestinal: Bowel Sounds present, soft, nontender.   Extremities: No peripheral edema. No clubbing or cyanosis.  Vascular: 2+ peripheral pulses  Neurological: A/O x 3, no focal deficits  Musculoskeletal: no calf tenderness.  Skin: No rashes.      ===============================  ===============================  LABS:                         10.9   9.42  )-----------( 129      ( 23 Jul 2022 23:38 )             32.5     25 Jul 2022 05:30    133    |  96     |  30     ----------------------------<  85     3.5     |  27     |  1.11   23 Jul 2022 23:38    137    |  109    |  33     ----------------------------<  94     3.8     |  22     |  1.22     Ca    10.4       25 Jul 2022 05:30  Ca    9.3        23 Jul 2022 23:38  Mg     1.7       23 Jul 2022 23:38    TPro  7.9    /  Alb  3.2    /  TBili  0.5    /  DBili  0.1    /  AST  59     /  ALT  185    /  AlkPhos  166    25 Jul 2022 05:30  TPro  5.8    /  Alb  2.7    /  TBili  0.2    /  DBili  x      /  AST  154    /  ALT  193    /  AlkPhos  136    23 Jul 2022 23:38      ===============================  ===============================  CARDIAC BIOMARKERS:  BNP  Serum Pro-Brain Natriuretic Peptide: 5470 pg/mL *H* [0 - 125] (07-23-22 @ 23:38)      TROPONIN  Troponin I, High Sensitivity Result: 174.96 ng/L *H* (07-24-22 @ 05:43)  Troponin I, High Sensitivity Result: 134.46 ng/L *H* (07-23-22 @ 23:38)    ===============================  ===============================  BLOOD CULTURES:    Blood Culture:   07-23 @ 23:38  Pro Bnp 5470    07-23 @ 23:38  TSH: 1.36    ===============================  ===============================  CARDIAC BIOMARKERS:  -------  -BNP VALUES:  07-23 @ 23:38  Pro Bnp 5470    -------  -TROPONIN VALUES:   Troponin I, High Sensitivity Result: 174.96 ng/L *H* (07-24-22 @ 05:43)  Troponin I, High Sensitivity Result: 134.46 ng/L *H* (07-23-22 @ 23:38)  ===============================  ===============================  EKG: sinus alicia possible LAE    TELE: no events.  ===============================  RADIOLOGY:    ACC: [id number] EXAM:  [facility]                          PROCEDURE DATE:  07/24/2022          INTERPRETATION:  CLINICAL INFORMATION: Shortness of breath    COMPARISON: None.    CONTRAST/COMPLICATIONS:  IV Contrast: Omnipaque 23831 cc administered   10 cc discarded  Oral Contrast: NONE  Complications: None reported at time of study completion    PROCEDURE:  CT Angiography of the Chest.  Sagittal and coronal reformats were performed as well as 3D (MIP)   reconstructions.    FINDINGS:    LUNGS AND AIRWAYS: Patent central airways.  Patchy bilateral nodular   opacities of the right lung are appreciated. There is small nodular   opacities also seen at the left lower lobe. There is intralobular septal   thickening noted bilaterally.  PLEURA: There is a trace left pleural effusion and a small right-sided   pleural effusion.  MEDIASTINUM AND BRIANA: No lymphadenopathy.  VESSELS: The aorta and pulmonary artery are of normal caliber. There are   no filling defects seen within the opacified pulmonary arterial tree.  HEART: Heart size is normal. No pericardial effusion.  CHEST WALL AND LOWER NECK: There are bilateral breast implants   appreciated.  VISUALIZED UPPER ABDOMEN: Within normal limits.  BONES: Within normal limits.    IMPRESSION:  Small right pleural effusion and trace pleural effusion.    Patchy nodular opacities, predominantly of the right lung though with   smaller peripheral nodules seen of the left lung. Differential   considerations include multifocal infection, parenchymal edema,   hemorrhage or other alveolar process. Interlobular septal thickening   suggestive of edema.    No pulmonary embolus.      --- End of Report ---    ZENY DUKES MD; Attending Radiologist  This document has been electronically signed. Jul 24 2022  1:05AM    -----------------      ACC: [id number] EXAM:  XR CHEST PA LAT 2V                          PROCEDURE DATE:  07/23/2022          INTERPRETATION:  CLINICAL INDICATION: Chest Pain    TECHNIQUE: Frontal and lateral chest radiographs on 7/23/2022 11:08 PM    COMPARISON: None.    FINDINGS:    Heart size may be exaggerated by AP technique. Pulmonary vascular   congestion. Hazy right infrahilar lung opacity. Small right pleural   effusion.    Osseous structures are within normal limits.      IMPRESSION:    Pulmonary vascular congestion. Hazy right infrahilar lung opacity. Small   right pleural effusion.    --- End of Report ---            PACO CLEANING MD; Attending Radiologist  This document has been electronically signed. Jul 24 2022  8:52AM  ===============================     Impression     Summary     The left ventricle is normal in size and wall thickness.   The basal septal wall appears hypokinetic.   Overall estimated left ventricular ejection fraction is 55-60 %.   Normal diastolic function.   Mild (1+) mitral regurgitation.   Mild (1+) tricuspid valve regurgitation. Normal PA systolic pressures.     Signature     ----------------------------------------------------------------   Electronically signed by Malik Galvan MD(Interpreting   physician) on 07/24/2022 09:33 PM   ----------------------------------------------------------------    ===============================    Malik Galvan M.D.  Cardiology, [facility]  Cell: [phone]  Offices:    ([facility] Office)  [phone] ([facility] Office)

## 2022-07-25 NOTE — PROGRESS NOTE ADULT - ASSESSMENT
PROBLEMS:    SOB suspect acute postpartum HF with preserved EF  Patchy nodular opacities, predominantly of the right lung though with smaller peripheral nodules seen of the left lung. Differential   considerations include multifocal infection, parenchymal edema, hemorrhage or other alveolar process. 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    PLAN:    pulmonary better- decd fu in the office  Lasix IV marked improvement after it  Hold IV abx doubt PNA; no cough or hemoptysis   BNP 3500.  Echo w/ preserved EF w/ small area of hypokinesis  Responding to IV diuretic therapy  Overall picture consistent w/ new onset diastolic dysfunction postpartum  Trop minimally elevated w/ no ischemic changes & no chest pain likely demand ischemia related to vascular congestion  Inflammotory markers  supportive care  would need to fu on CT   DVT prophylasix  
32y  Female PPD#6 s/p uncomplicated  a/w postpartum diastolic dysfunction of unknown etiology.   -Patient stable from OB standpoint - no acute OB issues at this time  -Appreciate Medical/Cardiology/Pulmonology care  -No contraindication with current medications and breastfeeding - encouraged her to continue to pump breasmilk and give it to her   -Instructed her to follow up with me in 4 days as out-patient - appt scheduled for

## 2022-07-25 NOTE — DISCHARGE NOTE PROVIDER - NSDCCPCAREPLAN_GEN_ALL_CORE_FT
PRINCIPAL DISCHARGE DIAGNOSIS  Diagnosis: Acute CHF  Assessment and Plan of Treatment: take Lasix and f/up with cardio and Pulm in a week

## 2022-07-25 NOTE — DISCHARGE NOTE PROVIDER - CARE PROVIDER_API CALL
Malik Galvan)  Cardiology  [address] [place] [address]  Phone: [phone]  Fax: [phone]  Follow Up Time: 1 week    Quang Almanza)  Critical Care Medicine; Internal Medicine; Pulmonary Disease; Sleep Medicine  [address] [place] [address]  Phone: [phone]  Fax: [phone]  Follow Up Time: 1 week

## 2022-07-25 NOTE — PROGRESS NOTE ADULT - PROBLEM SELECTOR PLAN 1
-Orthopnea by history.    -on admit: CXR & CT w/ pulmonary vascular congestion.  BNP 3500.  Echo w/ preserved EF w/ small area of hypokinesis.  Responded to IV diuretic therapy.  -Overall picture consistent w/ new onset diastolic dysfunction  postpartum.  Etiology unclear.  -Trop minimally elevated w/ no ischemic changes & no chest pain.  likely demand ischemia related to vascular congestion.  -Recommend d/c on lasix 20 mg QOD increase to daily for weight gain 3 pds in 3 days.  -followup w/ cardio in 1 week.

## 2022-07-28 DIAGNOSIS — I50.31 ACUTE DIASTOLIC (CONGESTIVE) HEART FAILURE: ICD-10-CM

## 2022-07-28 DIAGNOSIS — R79.89 OTHER SPECIFIED ABNORMAL FINDINGS OF BLOOD CHEMISTRY: ICD-10-CM

## 2022-07-28 DIAGNOSIS — R91.8 OTHER NONSPECIFIC ABNORMAL FINDING OF LUNG FIELD: ICD-10-CM

## 2022-07-28 DIAGNOSIS — Z98.82 BREAST IMPLANT STATUS: ICD-10-CM

## 2022-07-28 LAB
ANA TITR SER: NEGATIVE — SIGNIFICANT CHANGE UP
ANA TITR SER: NEGATIVE — SIGNIFICANT CHANGE UP

## 2022-08-04 DIAGNOSIS — O32.6XX0 MATERNAL CARE FOR COMPOUND PRESENTATION, NOT APPLICABLE OR UNSPECIFIED: ICD-10-CM

## 2022-08-04 DIAGNOSIS — Z34.83 ENCOUNTER FOR SUPERVISION OF OTHER NORMAL PREGNANCY, THIRD TRIMESTER: ICD-10-CM

## 2022-08-04 DIAGNOSIS — Z28.09 IMMUNIZATION NOT CARRIED OUT BECAUSE OF OTHER CONTRAINDICATION: ICD-10-CM

## 2022-08-04 DIAGNOSIS — Z3A.39 39 WEEKS GESTATION OF PREGNANCY: ICD-10-CM

## 2022-09-23 ENCOUNTER — OUTPATIENT (OUTPATIENT)
Dept: OUTPATIENT SERVICES | Facility: HOSPITAL | Age: 32
LOS: 1 days | End: 2022-09-23
Payer: COMMERCIAL

## 2022-09-23 ENCOUNTER — APPOINTMENT (OUTPATIENT)
Dept: CT IMAGING | Facility: CLINIC | Age: 32
End: 2022-09-23

## 2022-09-23 DIAGNOSIS — I50.43 ACUTE ON CHRONIC COMBINED SYSTOLIC (CONGESTIVE) AND DIASTOLIC (CONGESTIVE) HEART FAILURE: ICD-10-CM

## 2022-09-23 DIAGNOSIS — Z00.8 ENCOUNTER FOR OTHER GENERAL EXAMINATION: ICD-10-CM

## 2022-09-23 DIAGNOSIS — Z90.89 ACQUIRED ABSENCE OF OTHER ORGANS: Chronic | ICD-10-CM

## 2022-09-23 DIAGNOSIS — J32.9 CHRONIC SINUSITIS, UNSPECIFIED: Chronic | ICD-10-CM

## 2022-09-23 DIAGNOSIS — R91.8 OTHER NONSPECIFIC ABNORMAL FINDING OF LUNG FIELD: ICD-10-CM

## 2022-09-23 PROCEDURE — 71250 CT THORAX DX C-: CPT | Mod: 26

## 2022-09-23 PROCEDURE — 71250 CT THORAX DX C-: CPT

## 2023-03-28 ENCOUNTER — APPOINTMENT (OUTPATIENT)
Dept: CT IMAGING | Facility: CLINIC | Age: 33
End: 2023-03-28
Payer: COMMERCIAL

## 2023-03-28 ENCOUNTER — OUTPATIENT (OUTPATIENT)
Dept: OUTPATIENT SERVICES | Facility: HOSPITAL | Age: 33
LOS: 1 days | End: 2023-03-28
Payer: COMMERCIAL

## 2023-03-28 DIAGNOSIS — J32.9 CHRONIC SINUSITIS, UNSPECIFIED: Chronic | ICD-10-CM

## 2023-03-28 DIAGNOSIS — Z90.89 ACQUIRED ABSENCE OF OTHER ORGANS: Chronic | ICD-10-CM

## 2023-03-28 DIAGNOSIS — Z00.8 ENCOUNTER FOR OTHER GENERAL EXAMINATION: ICD-10-CM

## 2023-03-28 PROCEDURE — 71275 CT ANGIOGRAPHY CHEST: CPT | Mod: 26

## 2023-03-28 PROCEDURE — 71275 CT ANGIOGRAPHY CHEST: CPT

## 2025-01-24 NOTE — CONSULT NOTE ADULT - CONSULT REASON
Photo Preface (Leave Blank If You Do Not Want): Photographs were obtained today sob Detail Level: Zone

## 2025-05-09 NOTE — ED PROVIDER NOTE - NS_ ATTENDINGSCRIBEDETAILS _ED_A_ED_FT
1 = Total assistance
Marlen Esteban MD: The history, relevant review of systems, past medical and surgical history, medical decision making, and physical examination was documented by the scribe in my presence and I attest to the accuracy of the documentation.
